# Patient Record
Sex: MALE | Race: WHITE | Employment: FULL TIME
[De-identification: names, ages, dates, MRNs, and addresses within clinical notes are randomized per-mention and may not be internally consistent; named-entity substitution may affect disease eponyms.]

---

## 2020-02-10 ENCOUNTER — HEALTH MAINTENANCE LETTER (OUTPATIENT)
Age: 60
End: 2020-02-10

## 2020-11-14 ENCOUNTER — HEALTH MAINTENANCE LETTER (OUTPATIENT)
Age: 60
End: 2020-11-14

## 2021-03-02 ASSESSMENT — ENCOUNTER SYMPTOMS
PARESTHESIAS: 0
EYE PAIN: 0
HEMATOCHEZIA: 0
WEAKNESS: 0
HEMATURIA: 0
JOINT SWELLING: 0
SORE THROAT: 0
NAUSEA: 0
CONSTIPATION: 0
COUGH: 0
PALPITATIONS: 0
ARTHRALGIAS: 0
DIARRHEA: 0
MYALGIAS: 0
DIZZINESS: 0
DYSURIA: 0
HEADACHES: 0
NERVOUS/ANXIOUS: 0
HEARTBURN: 0
FEVER: 0
FREQUENCY: 0
CHILLS: 0
SHORTNESS OF BREATH: 0
ABDOMINAL PAIN: 0

## 2021-03-09 ENCOUNTER — OFFICE VISIT (OUTPATIENT)
Dept: FAMILY MEDICINE | Facility: CLINIC | Age: 61
End: 2021-03-09
Payer: COMMERCIAL

## 2021-03-09 VITALS
WEIGHT: 242.38 LBS | TEMPERATURE: 98.8 F | BODY MASS INDEX: 32.12 KG/M2 | SYSTOLIC BLOOD PRESSURE: 128 MMHG | DIASTOLIC BLOOD PRESSURE: 88 MMHG | OXYGEN SATURATION: 96 % | HEART RATE: 87 BPM | HEIGHT: 73 IN

## 2021-03-09 DIAGNOSIS — Z00.00 ROUTINE HISTORY AND PHYSICAL EXAMINATION OF ADULT: Primary | ICD-10-CM

## 2021-03-09 DIAGNOSIS — N52.9 ERECTILE DYSFUNCTION, UNSPECIFIED ERECTILE DYSFUNCTION TYPE: ICD-10-CM

## 2021-03-09 DIAGNOSIS — Z23 ENCOUNTER FOR IMMUNIZATION: ICD-10-CM

## 2021-03-09 DIAGNOSIS — D12.6 TUBULAR ADENOMA OF COLON: ICD-10-CM

## 2021-03-09 DIAGNOSIS — R73.01 IMPAIRED FASTING GLUCOSE: ICD-10-CM

## 2021-03-09 DIAGNOSIS — H61.23 BILATERAL IMPACTED CERUMEN: ICD-10-CM

## 2021-03-09 DIAGNOSIS — Z12.11 SCREEN FOR COLON CANCER: ICD-10-CM

## 2021-03-09 DIAGNOSIS — Z13.220 LIPID SCREENING: ICD-10-CM

## 2021-03-09 LAB
ANION GAP SERPL CALCULATED.3IONS-SCNC: 3 MMOL/L (ref 3–14)
BUN SERPL-MCNC: 11 MG/DL (ref 7–30)
CALCIUM SERPL-MCNC: 8.9 MG/DL (ref 8.5–10.1)
CHLORIDE SERPL-SCNC: 109 MMOL/L (ref 94–109)
CHOLEST SERPL-MCNC: 204 MG/DL
CO2 SERPL-SCNC: 28 MMOL/L (ref 20–32)
CREAT SERPL-MCNC: 0.86 MG/DL (ref 0.66–1.25)
GFR SERPL CREATININE-BSD FRML MDRD: >90 ML/MIN/{1.73_M2}
GLUCOSE SERPL-MCNC: 129 MG/DL (ref 70–99)
HBA1C MFR BLD: 7.1 % (ref 0–5.6)
HCV AB SERPL QL IA: NONREACTIVE
HDLC SERPL-MCNC: 50 MG/DL
LDLC SERPL CALC-MCNC: 121 MG/DL
NONHDLC SERPL-MCNC: 154 MG/DL
POTASSIUM SERPL-SCNC: 4.5 MMOL/L (ref 3.4–5.3)
SODIUM SERPL-SCNC: 140 MMOL/L (ref 133–144)
TRIGL SERPL-MCNC: 167 MG/DL

## 2021-03-09 PROCEDURE — 90471 IMMUNIZATION ADMIN: CPT | Performed by: PREVENTIVE MEDICINE

## 2021-03-09 PROCEDURE — 90714 TD VACC NO PRESV 7 YRS+ IM: CPT | Performed by: PREVENTIVE MEDICINE

## 2021-03-09 PROCEDURE — 36415 COLL VENOUS BLD VENIPUNCTURE: CPT | Performed by: PREVENTIVE MEDICINE

## 2021-03-09 PROCEDURE — 99213 OFFICE O/P EST LOW 20 MIN: CPT | Mod: 25 | Performed by: PREVENTIVE MEDICINE

## 2021-03-09 PROCEDURE — 86803 HEPATITIS C AB TEST: CPT | Performed by: PREVENTIVE MEDICINE

## 2021-03-09 PROCEDURE — 80048 BASIC METABOLIC PNL TOTAL CA: CPT | Performed by: PREVENTIVE MEDICINE

## 2021-03-09 PROCEDURE — 80061 LIPID PANEL: CPT | Performed by: PREVENTIVE MEDICINE

## 2021-03-09 PROCEDURE — 83036 HEMOGLOBIN GLYCOSYLATED A1C: CPT | Performed by: PREVENTIVE MEDICINE

## 2021-03-09 PROCEDURE — 69210 REMOVE IMPACTED EAR WAX UNI: CPT | Performed by: PREVENTIVE MEDICINE

## 2021-03-09 PROCEDURE — 99396 PREV VISIT EST AGE 40-64: CPT | Mod: 25 | Performed by: PREVENTIVE MEDICINE

## 2021-03-09 RX ORDER — SILDENAFIL CITRATE 20 MG/1
TABLET ORAL
Qty: 30 TABLET | Refills: 1 | Status: SHIPPED | OUTPATIENT
Start: 2021-03-09 | End: 2021-09-23

## 2021-03-09 ASSESSMENT — ENCOUNTER SYMPTOMS
NERVOUS/ANXIOUS: 0
DIZZINESS: 0
HEADACHES: 0
SORE THROAT: 0
PARESTHESIAS: 0
COUGH: 0
HEMATURIA: 0
HEMATOCHEZIA: 0
CHILLS: 0
HEARTBURN: 0
JOINT SWELLING: 0
ARTHRALGIAS: 0
FEVER: 0
DIARRHEA: 0
FREQUENCY: 0
EYE PAIN: 0
SHORTNESS OF BREATH: 0
ABDOMINAL PAIN: 0
WEAKNESS: 0
CONSTIPATION: 0
DYSURIA: 0
PALPITATIONS: 0
NAUSEA: 0
MYALGIAS: 0

## 2021-03-09 ASSESSMENT — PAIN SCALES - GENERAL: PAINLEVEL: NO PAIN (0)

## 2021-03-09 ASSESSMENT — MIFFLIN-ST. JEOR: SCORE: 1959.32

## 2021-03-09 NOTE — NURSING NOTE
Patient identified using two patient identifiers.  Ear exam showing wax occlusion completed by provider.  Solution: warm water and H202/H20 was placed in the bilateral ear(s) via irrigation tool: elephant ear.    Art Garcia CMA    Prior to immunization administration, verified patients identity using patient s name and date of birth. Please see Immunization Activity for additional information.     Screening Questionnaire for Adult Immunization    Are you sick today?   No   Do you have allergies to medications, food, a vaccine component or latex?   No   Have you ever had a serious reaction after receiving a vaccination?   No   Do you have a long-term health problem with heart, lung, kidney, or metabolic disease (e.g., diabetes), asthma, a blood disorder, no spleen, complement component deficiency, a cochlear implant, or a spinal fluid leak?  Are you on long-term aspirin therapy?   No   Do you have cancer, leukemia, HIV/AIDS, or any other immune system problem?   No   Do you have a parent, brother, or sister with an immune system problem?   No   In the past 3 months, have you taken medications that affect  your immune system, such as prednisone, other steroids, or anticancer drugs; drugs for the treatment of rheumatoid arthritis, Crohn s disease, or psoriasis; or have you had radiation treatments?   No   Have you had a seizure, or a brain or other nervous system problem?   No   During the past year, have you received a transfusion of blood or blood    products, or been given immune (gamma) globulin or antiviral drug?   No   For women: Are you pregnant or is there a chance you could become       pregnant during the next month?   No   Have you received any vaccinations in the past 4 weeks?   No     Immunization questionnaire answers were all negative.        Per orders of Dr. Kraus, injection of Td given by Art Garcia. Patient instructed to remain in clinic for 15 minutes afterwards, and to report any adverse  reaction to me immediately.       Screening performed by Art Garcia on 3/9/2021 at 8:32 AM.

## 2021-03-09 NOTE — PROGRESS NOTES
SUBJECTIVE:   CC: Mor Henry is an 60 year old male who presents for preventative health visit.       Patient has been advised of split billing requirements and indicates understanding: Yes  Healthy Habits:     Getting at least 3 servings of Calcium per day:  Yes    Bi-annual eye exam:  NO    Dental care twice a year:  Yes    Sleep apnea or symptoms of sleep apnea:  None    Diet:  Regular (no restrictions)    Frequency of exercise:  4-5 days/week    Duration of exercise:  15-30 minutes    Taking medications regularly:  Yes    Medication side effects:  None    PHQ-2 Total Score: 0    Additional concerns today:  No      Would like to discuss Viagra script:  -problems maintaining erection  -no penile pain or discharge  -no urine symptoms  -No use of cardiac medication or prostate medication   -no fever  -no abdominal pain      Today's PHQ-2 Score:   PHQ-2 ( 1999 [employer]) 3/2/2021   Q1: Little interest or pleasure in doing things 0   Q2: Feeling down, depressed or hopeless 0   PHQ-2 Score 0   Q1: Little interest or pleasure in doing things Not at all   Q2: Feeling down, depressed or hopeless Not at all   PHQ-2 Score 0       Abuse: Current or Past(Physical, Sexual or Emotional)- No  Do you feel safe in your environment? Yes    Have you ever done Advance Care Planning? (For example, a Health Directive, POLST, or a discussion with a medical provider or your loved ones about your wishes): No, advance care planning information given to patient to review.  Patient plans to discuss their wishes with loved ones or provider.      Social History     Tobacco Use     Smoking status: Never Smoker     Smokeless tobacco: Never Used   Substance Use Topics     Alcohol use: Yes     Alcohol/week: 0.0 standard drinks     If you drink alcohol do you typically have >3 drinks per day or >7 drinks per week? No    No flowsheet data found.    Last PSA: No results found for: PSA    Reviewed orders with patient. Reviewed health maintenance  and updated orders accordingly - Yes  Lab work is in process  Labs reviewed in EPIC  BP Readings from Last 3 Encounters:   03/09/21 128/88   10/05/15 123/88   08/25/15 135/85    Wt Readings from Last 3 Encounters:   03/09/21 109.9 kg (242 lb 6 oz)   10/05/15 102.1 kg (225 lb)   08/25/15 107 kg (236 lb)                  Patient Active Problem List   Diagnosis     CARDIOVASCULAR SCREENING; LDL GOAL LESS THAN 160     Obesity, Class I, BMI 30-34.9     Advanced directives, counseling/discussion     Impaired fasting glucose     Diverticulosis of large intestine     Past Surgical History:   Procedure Laterality Date     COLONOSCOPY N/A 10/5/2015    Procedure: COLONOSCOPY;  Surgeon: Emile Holloway MD;  Location: [facility] OR     COLONOSCOPY WITH CO2 INSUFFLATION N/A 10/5/2015    Procedure: COLONOSCOPY WITH CO2 INSUFFLATION;  Surgeon: Emile Holloway MD;  Location: [facility] OR       Social History     Tobacco Use     Smoking status: Never Smoker     Smokeless tobacco: Never Used   Substance Use Topics     Alcohol use: Yes     Alcohol/week: 0.0 standard drinks     History reviewed. No pertinent family history.      Current Outpatient Medications   Medication Sig Dispense Refill     sildenafil (REVATIO) 20 MG tablet 1-5 pills 2 hours before intercourse. Maximum 100 mg in 24 hours. 30 tablet 1     No Known Allergies    Reviewed and updated as needed this visit by clinical staff  Tobacco  Allergies  Meds  Problems  Med Hx  Surg Hx  Fam Hx  Soc Hx          Reviewed and updated as needed this visit by Provider  Tobacco  Allergies  Meds  Problems  Med Hx  Surg Hx  Fam Hx         History reviewed. No pertinent past medical history.   Past Surgical History:   Procedure Laterality Date     COLONOSCOPY N/A 10/5/2015    Procedure: COLONOSCOPY;  Surgeon: Emile Holloway MD;  Location: [facility] OR     COLONOSCOPY WITH CO2 INSUFFLATION N/A 10/5/2015    Procedure: COLONOSCOPY WITH CO2 INSUFFLATION;  Surgeon: Jewel  "Emile Moraes MD;  Location:  OR       Review of Systems   Constitutional: Negative for chills and fever.   HENT: Negative for congestion, ear pain, hearing loss and sore throat.    Eyes: Negative for pain and visual disturbance.   Respiratory: Negative for cough and shortness of breath.    Cardiovascular: Negative for chest pain, palpitations and peripheral edema.   Gastrointestinal: Negative for abdominal pain, constipation, diarrhea, heartburn, hematochezia and nausea.   Genitourinary: Positive for impotence. Negative for discharge, dysuria, frequency, genital sores, hematuria and urgency.   Musculoskeletal: Negative for arthralgias, joint swelling and myalgias.   Skin: Negative for rash.   Neurological: Negative for dizziness, weakness, headaches and paresthesias.   Psychiatric/Behavioral: Negative for mood changes. The patient is not nervous/anxious.      CONSTITUTIONAL: NEGATIVE for fever, chills, change in weight  INTEGUMENTARY/SKIN: NEGATIVE for worrisome rashes, moles or lesions  EYES: NEGATIVE for vision changes or irritation  ENT: NEGATIVE for ear, mouth and throat problems  RESP: NEGATIVE for significant cough or SOB  CV: NEGATIVE for chest pain, palpitations or peripheral edema  GI: NEGATIVE for nausea, abdominal pain, heartburn, or change in bowel habits  MUSCULOSKELETAL: NEGATIVE for significant arthralgias or myalgia  NEURO: NEGATIVE for weakness, dizziness or paresthesias  ENDOCRINE: NEGATIVE for temperature intolerance, skin/hair changes  HEME/ALLERGY/IMMUNE: NEGATIVE for bleeding problems  PSYCHIATRIC: NEGATIVE for changes in mood or affect    OBJECTIVE:   /88 (BP Location: Left arm, Patient Position: Sitting, Cuff Size: Adult Large)   Pulse 87   Temp 98.8  F (37.1  C) (Oral)   Ht 1.848 m (6' 0.75\")   Wt 109.9 kg (242 lb 6 oz)   SpO2 96%   BMI 32.20 kg/m      Physical Exam  GENERAL APPEARANCE: healthy, alert and no distress  EYES: Eyes grossly normal to inspection and conjunctivae and " sclerae normal  HENT: Bilateral impacted cerumen, ear wash done by the MA, able to see TMs, no perforation, no erythema   NECK: no adenopathy and trachea midline and normal to palpation  RESP: lungs clear to auscultation - no rales, rhonchi or wheezes  CV: regular rates and rhythm, normal S1 S2, no S3 or S4 and no murmur, click or rub  ABDOMEN: soft, non-tender and no rebound or guarding   MS: extremities normal- no gross deformities noted and peripheral pulses normal  SKIN: no suspicious lesions or rashes, seborrheic keratosis on the back+  NEURO: Normal strength and tone, mentation intact and speech normal  PSYCH: mentation appears normal      Diagnostic Test Results: Pending       ASSESSMENT/PLAN:   Mor was seen today for physical.    Diagnoses and all orders for this visit:    Routine history and physical examination of adult  -     Hemoglobin A1c  -     Lipid panel reflex to direct LDL Fasting  -     Hepatitis C antibody    Impaired fasting glucose  -     Hemoglobin A1c    Lipid screening  -     Lipid panel reflex to direct LDL Fasting    Tubular adenoma of colon  -     GASTROENTEROLOGY ADULT REF PROCEDURE ONLY; Future    Screen for colon cancer  -     GASTROENTEROLOGY ADULT REF PROCEDURE ONLY; Future    Encounter for immunization  -     TD PRESERV FREE, IM (7+ YRS)    Bilateral impacted cerumen  -ear wash done by the MA    Erectile dysfunction, unspecified erectile dysfunction type  -     sildenafil (REVATIO) 20 MG tablet; 1-5 pills 2 hours before intercourse. Maximum 100 mg in 24 hours.  -     Basic metabolic panel    He is informed that Viagra is usually not covered by insurance. It is available on a fee-for-service cost basis, and is relatively expensive. He can start with 20 mg and increase to 100 mg if necessary. The method of use 1 hour prior to anticipated intercourse is explained. He should not use any more than one tablet in a 24 hour period. The side effects of possible headache, flushing,  "dyspepsia and transient changes in vision have been explained. The patient is not taking nitrates, and denies he has access to nitrates in any form at any time. I have counseled him that taking Viagra with nitrates of any form can cause death. Additionally, Viagra serum concentrations can be increased by the following: cimetidine, erythromycin, itraconazole or ketoconazole. This patient does not take these drugs.    Patient has been advised of split billing requirements and indicates understanding: Yes  COUNSELING:   Reviewed preventive health counseling, as reflected in patient instructions       Regular exercise       Healthy diet/nutrition       Vision screening       Immunizations    Vaccinated for: Td             Consider Hep C screening for all patients one time for ages 18-79 years       Colon cancer screening    Estimated body mass index is 32.2 kg/m  as calculated from the following:    Height as of this encounter: 1.848 m (6' 0.75\").    Weight as of this encounter: 109.9 kg (242 lb 6 oz).     Weight management plan: Discussed healthy diet and exercise guidelines    He reports that he has never smoked. He has never used smokeless tobacco.      Counseling Resources:  ATP IV Guidelines  Pooled Cohorts Equation Calculator  FRAX Risk Assessment  ICSI Preventive Guidelines  Dietary Guidelines for Americans, 2010  USDA's MyPlate  ASA Prophylaxis  Lung CA Screening    Julia Kraus MD MPH    [facility]  "

## 2021-03-10 NOTE — RESULT ENCOUNTER NOTE
Mor,     Three month glucose number is showing that you have Diabetes.  Electrolytes and kidney function are normal.  Screening test for Hepatitis C is negative.  LDL cholesterol is elevated at 121, should be less than 100 in diabetics.  Please schedule a Video visit to discuss management of diabetes.    Please do not hesitate to call us at [phone] if you have any questions or concerns.    Thank you,    Julia Kraus MD MPH

## 2021-03-16 ENCOUNTER — VIRTUAL VISIT (OUTPATIENT)
Dept: FAMILY MEDICINE | Facility: CLINIC | Age: 61
End: 2021-03-16
Payer: COMMERCIAL

## 2021-03-16 VITALS — HEIGHT: 72 IN | WEIGHT: 242 LBS | BODY MASS INDEX: 32.78 KG/M2

## 2021-03-16 DIAGNOSIS — E11.9 TYPE 2 DIABETES MELLITUS WITHOUT COMPLICATION, WITHOUT LONG-TERM CURRENT USE OF INSULIN (H): Primary | ICD-10-CM

## 2021-03-16 DIAGNOSIS — E78.5 HYPERLIPIDEMIA LDL GOAL <100: ICD-10-CM

## 2021-03-16 PROCEDURE — 99214 OFFICE O/P EST MOD 30 MIN: CPT | Mod: 95 | Performed by: PREVENTIVE MEDICINE

## 2021-03-16 RX ORDER — ATORVASTATIN CALCIUM 20 MG/1
20 TABLET, FILM COATED ORAL DAILY
Qty: 90 TABLET | Refills: 1 | Status: SHIPPED | OUTPATIENT
Start: 2021-03-16 | End: 2021-09-23

## 2021-03-16 ASSESSMENT — MIFFLIN-ST. JEOR: SCORE: 1945.7

## 2021-03-16 NOTE — PROGRESS NOTES
Forrest is a 60 year old who is being evaluated via a billable telephone visit.      What phone number would you like to be contacted at? [phone]   How would you like to obtain your AVS? MyChart    Assessment & Plan     Type 2 diabetes mellitus without complication, without long-term current use of insulin (H)  -New diagnosis  -HbA1C is at 7.1  -patient would like to start with lifestyle modifications before using oral hypoglycemics  -recheck labs in 2-3 months, if not at goal then start Metformin XR   -we discussed a goal weight loss of 5% body weight in the next 6 months   - aspirin (ASA) 81 MG EC tablet  Dispense: 90 tablet; Refill: 3  - Albumin Random Urine Quantitative with Creat Ratio  - Basic metabolic panel  - LDL cholesterol direct  - Hemoglobin A1c    Hyperlipidemia LDL goal <100  -LDL at 121  - atorvastatin (LIPITOR) 20 MG tablet  Dispense: 90 tablet; Refill: 1  - LDL cholesterol direct    The 10-year ASCVD risk score (Anuradha SHERIFF Jr., et al., 2013) is: 16.3%    Values used to calculate the score:      Age: 60 years      Sex: Male      Is Non- : No      Diabetic: Yes      Tobacco smoker: No      Systolic Blood Pressure: 128 mmHg      Is BP treated: No      HDL Cholesterol: 50 mg/dL      Total Cholesterol: 204 mg/dL      20 minutes spent on the date of the encounter doing chart review, history and exam, documentation and further activities as noted above         Return in about 2 months (around 5/16/2021) for labs.    Julia Kraus MD MPH    [facility]   Forrest is a 60 year old who presents for the following health issues:  HPI     Diabetes New Diagnosis       How often are you checking your blood sugar? Not at all    What concerns do you have today about your diabetes? None     Do you have any of these symptoms? (Select all that apply)  No numbness or tingling in feet.  No redness, sores or blisters on feet.  No complaints of excessive thirst.   No reports of blurry vision.  No significant changes to weight.      BP Readings from Last 2 Encounters:   03/09/21 128/88   10/05/15 123/88     Hemoglobin A1C (%)   Date Value   03/09/2021 7.1 (H)     LDL Cholesterol Calculated (mg/dL)   Date Value   03/09/2021 121 (H)   08/25/2015 112                 How many servings of fruits and vegetables do you eat daily?  2-3    On average, how many sweetened beverages do you drink each day (Examples: soda, juice, sweet tea, etc.  Do NOT count diet or artificially sweetened beverages)?   A couple per week    How many days per week do you exercise enough to make your heart beat faster? 5    How many minutes a day do you exercise enough to make your heart beat faster? 30 - 60    How many days per week do you miss taking your medication? 0        Review of Systems   Constitutional, HEENT, cardiovascular, pulmonary, gi and gu systems are negative, except as otherwise noted.      Objective           Vitals:  No vitals were obtained today due to virtual visit.    Physical Exam   healthy, alert and no distress  PSYCH: Alert and oriented times 3; coherent speech, normal   rate and volume, able to articulate logical thoughts, able   to abstract reason, no tangential thoughts, no hallucinations   or delusions  His affect is normal  RESP: No cough, no audible wheezing, able to talk in full sentences  Remainder of exam unable to be completed due to telephone visits    Office Visit on 03/09/2021   Component Date Value Ref Range Status     Hemoglobin A1C 03/09/2021 7.1* 0 - 5.6 % Final    Comment: Normal <5.7% Prediabetes 5.7-6.4%  Diabetes 6.5% or higher - adopted from ADA   consensus guidelines.       Cholesterol 03/09/2021 204* <200 mg/dL Final    Desirable:       <200 mg/dl     Triglycerides 03/09/2021 167* <150 mg/dL Final    Comment: Borderline high:  150-199 mg/dl  High:             200-499 mg/dl  Very high:       >499 mg/dl       HDL Cholesterol 03/09/2021 50  >39 mg/dL Final      LDL Cholesterol Calculated 03/09/2021 121* <100 mg/dL Final    Comment: Above desirable:  100-129 mg/dl  Borderline High:  130-159 mg/dL  High:             160-189 mg/dL  Very high:       >189 mg/dl       Non HDL Cholesterol 03/09/2021 154* <130 mg/dL Final    Comment: Above Desirable:  130-159 mg/dl  Borderline high:  160-189 mg/dl  High:             190-219 mg/dl  Very high:       >219 mg/dl       Hepatitis C Antibody 03/09/2021 Nonreactive  NR^Nonreactive Final    Comment: Assay performance characteristics have not been established for newborns,   infants, and children       Sodium 03/09/2021 140  133 - 144 mmol/L Final     Potassium 03/09/2021 4.5  3.4 - 5.3 mmol/L Final     Chloride 03/09/2021 109  94 - 109 mmol/L Final     Carbon Dioxide 03/09/2021 28  20 - 32 mmol/L Final     Anion Gap 03/09/2021 3  3 - 14 mmol/L Final     Glucose 03/09/2021 129* 70 - 99 mg/dL Final     Urea Nitrogen 03/09/2021 11  7 - 30 mg/dL Final     Creatinine 03/09/2021 0.86  0.66 - 1.25 mg/dL Final     GFR Estimate 03/09/2021 >90  >60 mL/min/[1.73_m2] Final    Comment: Non  GFR Calc  Starting 12/18/2018, serum creatinine based estimated GFR (eGFR) will be   calculated using the Chronic Kidney Disease Epidemiology Collaboration   (CKD-EPI) equation.       GFR Estimate If Black 03/09/2021 >90  >60 mL/min/[1.73_m2] Final    Comment:  GFR Calc  Starting 12/18/2018, serum creatinine based estimated GFR (eGFR) will be   calculated using the Chronic Kidney Disease Epidemiology Collaboration   (CKD-EPI) equation.       Calcium 03/09/2021 8.9  8.5 - 10.1 mg/dL Final             Phone call duration: 8 minutes

## 2021-03-16 NOTE — PATIENT INSTRUCTIONS
Patient Education   Sample Meal Plan for Diabetes  Breakfast (4 carb choices, 60 grams carbohydrate)  Coffee, tea or water  1 cup (8 ounces) skim or 1% milk (1-carb choice)  1 small piece of fresh fruit or 1 cup berries (1-carb choice)  Any one of the following (2-carb choice):    1 slice toast with 1 tablespoon of margarine or peanut butter and   cup of cereal with skim or 1% milk    1 cup cereal with skim or 1% milk    1 egg and 2 slices toast with 1 tablespoon margarine or peanut butter  Lunch (4 carb choices, 60 grams carbohydrate)  Coffee, tea or water  1 cup (8 ounces) skim or 1% milk (1-carb choice)  Small piece fresh fruit or 1 cup berries (1-carb choice)  Raw vegetables (add to sandwich or serve on the side)  Any one of the following (2-carb choice):     [place] (made with 2 slices whole-grain bread)      sandwich with 1 cup soup    2 corn tortillas with meat and vegetables  Dinner (4 carb choices, 60 grams carbohydrate)  Coffee, tea or water  Breast of chicken or pork chop (3 to 4 ounces)  Cooked vegetable  Tossed salad with small amount of low-fat dressing  1 cup (8 ounces) skim or 1% milk (1-carb choice)  1 small piece fruit or   cup canned fruit, packed in juice or light syrup (1-carb choice)  Any one of the following (2-carb choice):    Medium baked potato    1 cup mashed potato    2/3 cup rice or pasta  Snacks (1 or 2 carb choices, 15 to 30 grams carbohydrate)  Any one or two of the following:    Small piece fresh fruit    3 yocasta cracker squares    1 cup raw vegetables with low-fat dip    1 ounce (12 to 15) baked chips with salsa    Light yogurt (100 calories)    1 cup (8 ounces) skim or 1% milk    3 cups popped popcorn    6 vanilla wafers  For informational purposes only. Not to replace the advice of your health care provider.   Copyright   2007 [employer] [facility]. All rights reserved. [employer] [id number] - REV 04/16.       Patient Education   Living Healthy with Diabetes  Healthful  eating  Healthful eating means eating well-balanced meals and snacks at regular times.    Eat a variety of healthy foods to help control blood glucose (blood sugar).    Space your meals during the day. Try to eat a meal every 4 to 5 hours. Include a variety of foods from all food groups.    If you wish, you may have one or two small snacks between meals. Snacks should be nutritious and lower in calories than a meal. (For example: fat-free yogurt, 1/2 cup fruit, 3 cups popcorn, 1/4 cup nuts). Ask your dietitian about healthful snacks.    Do not skip meals.  There are no foods that you cannot eat. But it is important to know which foods most affect your blood glucose.  Three main nutrients give the body energy (calories):    Carbohydrate    Protein    Fat    Foods with carbohydrate will raise blood glucose. These foods include breads, pasta, cereals, rice, fruits, milk and yogurt.    Do not avoid carbohydrate. Eating the right amount of carbohydrate at meals and snacks will help to control blood glucose.    Check blood glucose as directed by your health care provider to see how food choices affect it.  Healthy Eating at a Glance  ? Pay attention to portion sizes.  ? Eat a variety of healthy foods every day.  ? Choose foods high in nutrition:    Fruits and vegetables    Beans and legumes    Whole grains    Heart healthy fats    Lean meats and proteins    Foods without added sodium, sugars and fat    Manage your weight  Managing weight is not only about how much you eat, but also about the quality of foods you eat.    Eat smaller portions.    Eat less sugar.    Eat less of the high-fat foods.  ? Eat smaller portions of healthy fats such as nuts, avocado and olives.  ? Limit fried foods, fatty meats (val[place], sausage, hot dogs, cold cuts), butter, salad dressings, cream, gravy, chips, bakery items, whole milk, ice cream, pizza, fast food and hard cheeses.    Choose high-fiber foods such as vegetables, fruits and  whole-grain breads and cereals.  ? These foods help you to feel more satisfied with your meal or snack. They are full of nutrients.    Eat mindfully  ? Listen to your body's signals for hunger. Eat when you feel hungry and stop when you start to feel full.  ? Avoid eating when bored, sad or upset.  Physical activity  Activity can help control your glucose levels. The American Diabetes Association recommends 150 minutes of moderate physical activity (walking, biking, swimming) a week or 75 minutes of vigorous activity (jogging, aerobics) per week. Add to that strength training (lifting weights, resistance bands) two or three days a week.  Talk to your doctor before starting an activity program. This is very important if:    You are over age 35.    You have had type 1 diabetes for more than 15 years.    You have had type 2 diabetes for more than 10 years.    You have any risk factors for heart or artery disease (such as high blood pressure, high cholesterol or being overweight).    You have a history of heart or artery disease.    You have any kind of nerve damage (neuropathy).    You have eye disease (retinopathy).  For informational purposes only. Not to replace the advice of your health care provider.  Copyright   2007 [employer] [facility]. All rights reserved. [employer] [id number] - REV 03/16.

## 2021-04-12 DIAGNOSIS — Z11.59 ENCOUNTER FOR SCREENING FOR OTHER VIRAL DISEASES: ICD-10-CM

## 2021-04-16 RX ORDER — BISACODYL 5 MG
5 TABLET, DELAYED RELEASE (ENTERIC COATED) ORAL SEE ADMIN INSTRUCTIONS
Qty: 1 TABLET | Refills: 0 | Status: SHIPPED | OUTPATIENT
Start: 2021-04-16 | End: 2022-01-21

## 2021-04-16 RX ORDER — SODIUM, POTASSIUM,MAG SULFATES 17.5-3.13G
1 SOLUTION, RECONSTITUTED, ORAL ORAL SEE ADMIN INSTRUCTIONS
Qty: 1 ML | Refills: 0 | Status: SHIPPED | OUTPATIENT
Start: 2021-04-16 | End: 2022-01-21

## 2021-04-23 DIAGNOSIS — Z11.59 ENCOUNTER FOR SCREENING FOR OTHER VIRAL DISEASES: ICD-10-CM

## 2021-04-23 LAB
SARS-COV-2 RNA RESP QL NAA+PROBE: NORMAL
SPECIMEN SOURCE: NORMAL

## 2021-04-23 PROCEDURE — U0003 INFECTIOUS AGENT DETECTION BY NUCLEIC ACID (DNA OR RNA); SEVERE ACUTE RESPIRATORY SYNDROME CORONAVIRUS 2 (SARS-COV-2) (CORONAVIRUS DISEASE [COVID-19]), AMPLIFIED PROBE TECHNIQUE, MAKING USE OF HIGH THROUGHPUT TECHNOLOGIES AS DESCRIBED BY CMS-2020-01-R: HCPCS | Performed by: SURGERY

## 2021-04-23 PROCEDURE — U0005 INFEC AGEN DETEC AMPLI PROBE: HCPCS | Performed by: SURGERY

## 2021-04-24 LAB
LABORATORY COMMENT REPORT: NORMAL
SARS-COV-2 RNA RESP QL NAA+PROBE: NEGATIVE
SPECIMEN SOURCE: NORMAL

## 2021-04-26 ENCOUNTER — HOSPITAL ENCOUNTER (OUTPATIENT)
Facility: AMBULATORY SURGERY CENTER | Age: 61
Discharge: HOME | End: 2021-04-26
Attending: SURGERY | Admitting: SURGERY
Payer: COMMERCIAL

## 2021-04-26 VITALS
RESPIRATION RATE: 16 BRPM | DIASTOLIC BLOOD PRESSURE: 84 MMHG | HEART RATE: 59 BPM | TEMPERATURE: 98.2 F | BODY MASS INDEX: 31.19 KG/M2 | OXYGEN SATURATION: 93 % | WEIGHT: 230 LBS | SYSTOLIC BLOOD PRESSURE: 128 MMHG

## 2021-04-26 DIAGNOSIS — Z12.11 SPECIAL SCREENING FOR MALIGNANT NEOPLASMS, COLON: Primary | ICD-10-CM

## 2021-04-26 LAB — COLONOSCOPY: NORMAL

## 2021-04-26 PROCEDURE — 45385 COLONOSCOPY W/LESION REMOVAL: CPT | Mod: PT

## 2021-04-26 PROCEDURE — 99152 MOD SED SAME PHYS/QHP 5/>YRS: CPT | Mod: 59 | Performed by: SURGERY

## 2021-04-26 PROCEDURE — 45385 COLONOSCOPY W/LESION REMOVAL: CPT | Mod: PT | Performed by: SURGERY

## 2021-04-26 PROCEDURE — G8907 PT DOC NO EVENTS ON DISCHARG: HCPCS

## 2021-04-26 PROCEDURE — G8918 PT W/O PREOP ORDER IV AB PRO: HCPCS

## 2021-04-26 PROCEDURE — 88305 TISSUE EXAM BY PATHOLOGIST: CPT | Performed by: PATHOLOGY

## 2021-04-26 PROCEDURE — 99153 MOD SED SAME PHYS/QHP EA: CPT | Mod: 59 | Performed by: SURGERY

## 2021-04-26 RX ORDER — ONDANSETRON 4 MG/1
4 TABLET, ORALLY DISINTEGRATING ORAL EVERY 6 HOURS PRN
Status: DISCONTINUED | OUTPATIENT
Start: 2021-04-26 | End: 2021-04-27 | Stop reason: HOSPADM

## 2021-04-26 RX ORDER — FLUMAZENIL 0.1 MG/ML
0.2 INJECTION, SOLUTION INTRAVENOUS
Status: ACTIVE | OUTPATIENT
Start: 2021-04-26 | End: 2021-04-26

## 2021-04-26 RX ORDER — NALOXONE HYDROCHLORIDE 0.4 MG/ML
0.4 INJECTION, SOLUTION INTRAMUSCULAR; INTRAVENOUS; SUBCUTANEOUS
Status: DISCONTINUED | OUTPATIENT
Start: 2021-04-26 | End: 2021-04-27 | Stop reason: HOSPADM

## 2021-04-26 RX ORDER — FENTANYL CITRATE 50 UG/ML
INJECTION, SOLUTION INTRAMUSCULAR; INTRAVENOUS PRN
Status: DISCONTINUED | OUTPATIENT
Start: 2021-04-26 | End: 2021-04-26 | Stop reason: HOSPADM

## 2021-04-26 RX ORDER — PROCHLORPERAZINE MALEATE 10 MG
10 TABLET ORAL EVERY 6 HOURS PRN
Status: DISCONTINUED | OUTPATIENT
Start: 2021-04-26 | End: 2021-04-27 | Stop reason: HOSPADM

## 2021-04-26 RX ORDER — NALOXONE HYDROCHLORIDE 0.4 MG/ML
0.2 INJECTION, SOLUTION INTRAMUSCULAR; INTRAVENOUS; SUBCUTANEOUS
Status: DISCONTINUED | OUTPATIENT
Start: 2021-04-26 | End: 2021-04-27 | Stop reason: HOSPADM

## 2021-04-26 RX ORDER — LIDOCAINE 40 MG/G
CREAM TOPICAL
Status: DISCONTINUED | OUTPATIENT
Start: 2021-04-26 | End: 2021-04-27 | Stop reason: HOSPADM

## 2021-04-26 RX ORDER — ONDANSETRON 2 MG/ML
4 INJECTION INTRAMUSCULAR; INTRAVENOUS EVERY 6 HOURS PRN
Status: DISCONTINUED | OUTPATIENT
Start: 2021-04-26 | End: 2021-04-27 | Stop reason: HOSPADM

## 2021-04-28 LAB — COPATH REPORT: NORMAL

## 2021-07-18 ENCOUNTER — HEALTH MAINTENANCE LETTER (OUTPATIENT)
Age: 61
End: 2021-07-18

## 2021-09-12 ENCOUNTER — HEALTH MAINTENANCE LETTER (OUTPATIENT)
Age: 61
End: 2021-09-12

## 2021-09-14 ENCOUNTER — LAB (OUTPATIENT)
Dept: LAB | Facility: CLINIC | Age: 61
End: 2021-09-14
Payer: COMMERCIAL

## 2021-09-14 DIAGNOSIS — E78.5 HYPERLIPIDEMIA LDL GOAL <100: ICD-10-CM

## 2021-09-14 DIAGNOSIS — E11.9 TYPE 2 DIABETES MELLITUS WITHOUT COMPLICATION, WITHOUT LONG-TERM CURRENT USE OF INSULIN (H): ICD-10-CM

## 2021-09-14 LAB
ANION GAP SERPL CALCULATED.3IONS-SCNC: 7 MMOL/L (ref 3–14)
BUN SERPL-MCNC: 12 MG/DL (ref 7–30)
CALCIUM SERPL-MCNC: 8.9 MG/DL (ref 8.5–10.1)
CHLORIDE BLD-SCNC: 105 MMOL/L (ref 94–109)
CO2 SERPL-SCNC: 27 MMOL/L (ref 20–32)
CREAT SERPL-MCNC: 0.74 MG/DL (ref 0.66–1.25)
GFR SERPL CREATININE-BSD FRML MDRD: >90 ML/MIN/1.73M2
GLUCOSE BLD-MCNC: 84 MG/DL (ref 70–99)
HBA1C MFR BLD: 5.8 % (ref 0–5.6)
LDLC SERPL CALC-MCNC: 63 MG/DL
POTASSIUM BLD-SCNC: 3.8 MMOL/L (ref 3.4–5.3)
SODIUM SERPL-SCNC: 139 MMOL/L (ref 133–144)

## 2021-09-14 PROCEDURE — 36415 COLL VENOUS BLD VENIPUNCTURE: CPT

## 2021-09-14 PROCEDURE — 83036 HEMOGLOBIN GLYCOSYLATED A1C: CPT

## 2021-09-14 PROCEDURE — 82043 UR ALBUMIN QUANTITATIVE: CPT

## 2021-09-14 PROCEDURE — 80048 BASIC METABOLIC PNL TOTAL CA: CPT

## 2021-09-14 PROCEDURE — 83721 ASSAY OF BLOOD LIPOPROTEIN: CPT

## 2021-09-14 NOTE — RESULT ENCOUNTER NOTE
Mor,     3-month glucose number hemoglobin A1c has improved significantly from 7.1 to 5.8.  This is very good progress!  Other lab results are pending at this time.    Please do not hesitate to call us at [phone] if you have any questions or concerns.    Thank you,    Julia Kraus MD MPH

## 2021-09-15 LAB
CREAT UR-MCNC: 11 MG/DL
MICROALBUMIN UR-MCNC: <5 MG/L
MICROALBUMIN/CREAT UR: NORMAL MG/G{CREAT}

## 2021-09-15 NOTE — RESULT ENCOUNTER NOTE
Mor, your test results were within normal limits.  Electrolytes, glucose and kidney function are normal.  LDL cholesterol has improved from 121 to 63 and is at goal.     Please do not hesitate to call us at [phone] if you have any questions or concerns.    Thank you,    Julia Kraus MD MPH

## 2021-09-16 NOTE — RESULT ENCOUNTER NOTE
Mor,    Urine sample is not showing any abnormal protein.     Please do not hesitate to call us at [phone] if you have any questions or concerns.    Thank you,    Julia Kraus MD MPH

## 2021-09-22 DIAGNOSIS — N52.9 ERECTILE DYSFUNCTION, UNSPECIFIED ERECTILE DYSFUNCTION TYPE: ICD-10-CM

## 2021-09-22 DIAGNOSIS — E78.5 HYPERLIPIDEMIA LDL GOAL <100: ICD-10-CM

## 2021-09-23 RX ORDER — ATORVASTATIN CALCIUM 20 MG/1
20 TABLET, FILM COATED ORAL DAILY
Qty: 90 TABLET | Refills: 3 | Status: SHIPPED | OUTPATIENT
Start: 2021-09-23 | End: 2022-09-26

## 2021-09-23 RX ORDER — SILDENAFIL CITRATE 20 MG/1
TABLET ORAL
Qty: 30 TABLET | Refills: 2 | Status: SHIPPED | OUTPATIENT
Start: 2021-09-23 | End: 2022-06-30

## 2022-01-02 ENCOUNTER — HEALTH MAINTENANCE LETTER (OUTPATIENT)
Age: 62
End: 2022-01-02

## 2022-01-18 ASSESSMENT — ENCOUNTER SYMPTOMS
HEARTBURN: 0
MYALGIAS: 0
DIARRHEA: 0
SHORTNESS OF BREATH: 0
NAUSEA: 0
FEVER: 0
DIZZINESS: 0
DYSURIA: 0
ARTHRALGIAS: 0
NERVOUS/ANXIOUS: 0
PALPITATIONS: 0
FREQUENCY: 0
COUGH: 0
WEAKNESS: 0
HEADACHES: 0
CONSTIPATION: 0
PARESTHESIAS: 0
JOINT SWELLING: 0
SORE THROAT: 0
ABDOMINAL PAIN: 0
HEMATOCHEZIA: 0
EYE PAIN: 0
HEMATURIA: 0
CHILLS: 0

## 2022-01-21 ENCOUNTER — OFFICE VISIT (OUTPATIENT)
Dept: FAMILY MEDICINE | Facility: CLINIC | Age: 62
End: 2022-01-21
Payer: COMMERCIAL

## 2022-01-21 VITALS
HEIGHT: 73 IN | HEART RATE: 72 BPM | BODY MASS INDEX: 28.57 KG/M2 | WEIGHT: 215.6 LBS | DIASTOLIC BLOOD PRESSURE: 84 MMHG | TEMPERATURE: 97.7 F | SYSTOLIC BLOOD PRESSURE: 128 MMHG | OXYGEN SATURATION: 97 %

## 2022-01-21 DIAGNOSIS — Z00.00 ROUTINE HISTORY AND PHYSICAL EXAMINATION OF ADULT: Primary | ICD-10-CM

## 2022-01-21 DIAGNOSIS — Z12.5 SCREENING FOR PROSTATE CANCER: ICD-10-CM

## 2022-01-21 DIAGNOSIS — R97.20 ELEVATED PROSTATE SPECIFIC ANTIGEN (PSA): ICD-10-CM

## 2022-01-21 DIAGNOSIS — Z13.1 SCREENING FOR DIABETES MELLITUS: ICD-10-CM

## 2022-01-21 LAB
CHOLEST SERPL-MCNC: 123 MG/DL
FASTING STATUS PATIENT QL REPORTED: YES
HBA1C MFR BLD: 6 % (ref 0–5.6)
HDLC SERPL-MCNC: 61 MG/DL
LDLC SERPL CALC-MCNC: 42 MG/DL
NONHDLC SERPL-MCNC: 62 MG/DL
PSA SERPL-MCNC: 5.55 UG/L (ref 0–4)
TRIGL SERPL-MCNC: 99 MG/DL

## 2022-01-21 PROCEDURE — 36415 COLL VENOUS BLD VENIPUNCTURE: CPT | Performed by: INTERNAL MEDICINE

## 2022-01-21 PROCEDURE — G0103 PSA SCREENING: HCPCS | Performed by: INTERNAL MEDICINE

## 2022-01-21 PROCEDURE — 80061 LIPID PANEL: CPT | Performed by: INTERNAL MEDICINE

## 2022-01-21 PROCEDURE — 99396 PREV VISIT EST AGE 40-64: CPT | Performed by: INTERNAL MEDICINE

## 2022-01-21 PROCEDURE — 83036 HEMOGLOBIN GLYCOSYLATED A1C: CPT | Performed by: INTERNAL MEDICINE

## 2022-01-21 ASSESSMENT — ENCOUNTER SYMPTOMS
WEAKNESS: 0
DIARRHEA: 0
ABDOMINAL PAIN: 0
ARTHRALGIAS: 0
HEADACHES: 0
FREQUENCY: 0
DIZZINESS: 0
SHORTNESS OF BREATH: 0
CONSTIPATION: 0
HEARTBURN: 0
PARESTHESIAS: 0
SORE THROAT: 0
EYE PAIN: 0
DYSURIA: 0
NERVOUS/ANXIOUS: 0
NAUSEA: 0
JOINT SWELLING: 0
HEMATURIA: 0
HEMATOCHEZIA: 0
PALPITATIONS: 0
CHILLS: 0
FEVER: 0
COUGH: 0
MYALGIAS: 0

## 2022-01-21 ASSESSMENT — MIFFLIN-ST. JEOR: SCORE: 1831.09

## 2022-01-21 ASSESSMENT — PAIN SCALES - GENERAL: PAINLEVEL: NO PAIN (0)

## 2022-01-21 NOTE — PROGRESS NOTES
SUBJECTIVE:   CC: Mor Henry is an 61 year old male who presents for preventative health visit.       Patient has been advised of split billing requirements and indicates understanding: No  Healthy Habits:     Getting at least 3 servings of Calcium per day:  Yes    Bi-annual eye exam:  Yes    Dental care twice a year:  Yes    Sleep apnea or symptoms of sleep apnea:  None    Diet:  Carbohydrate counting    Frequency of exercise:  2-3 days/week    Duration of exercise:  30-45 minutes    Taking medications regularly:  Yes    Medication side effects:  None    PHQ-2 Total Score: 0    Additional concerns today:  No              Today's PHQ-2 Score:   PHQ-2 ( 1999 [employer]) 1/18/2022   Q1: Little interest or pleasure in doing things 0   Q2: Feeling down, depressed or hopeless 0   PHQ-2 Score 0   PHQ-2 Total Score (12-17 Years)- Positive if 3 or more points; Administer PHQ-A if positive -   Q1: Little interest or pleasure in doing things Not at all   Q2: Feeling down, depressed or hopeless Not at all   PHQ-2 Score 0       Abuse: Current or Past(Physical, Sexual or Emotional)- No  Do you feel safe in your environment? Yes        Social History     Tobacco Use     Smoking status: Never Smoker     Smokeless tobacco: Never Used   Substance Use Topics     Alcohol use: Yes     Alcohol/week: 0.0 standard drinks     If you drink alcohol do you typically have >3 drinks per day or >7 drinks per week? No    Alcohol Use 1/21/2022   Prescreen: >3 drinks/day or >7 drinks/week? -   Prescreen: >3 drinks/day or >7 drinks/week? No       Last PSA: No results found for: PSA    Reviewed orders with patient. Reviewed health maintenance and updated orders accordingly - Yes  Lab work is in process    Reviewed and updated as needed this visit by clinical staff  Tobacco  Allergies  Meds   Med Hx  Surg Hx  Fam Hx  Soc Hx       Reviewed and updated as needed this visit by Provider               History reviewed. No pertinent past medical  "history.     Review of Systems   Constitutional: Negative for chills and fever.   HENT: Negative for congestion, ear pain, hearing loss and sore throat.    Eyes: Negative for pain and visual disturbance.   Respiratory: Negative for cough and shortness of breath.    Cardiovascular: Negative for chest pain, palpitations and peripheral edema.   Gastrointestinal: Negative for abdominal pain, constipation, diarrhea, heartburn, hematochezia and nausea.   Genitourinary: Negative for dysuria, frequency, genital sores, hematuria, impotence, penile discharge and urgency.   Musculoskeletal: Negative for arthralgias, joint swelling and myalgias.   Skin: Negative for rash.   Neurological: Negative for dizziness, weakness, headaches and paresthesias.   Psychiatric/Behavioral: Negative for mood changes. The patient is not nervous/anxious.          OBJECTIVE:   /84   Pulse 72   Temp 97.7  F (36.5  C) (Tympanic)   Ht 1.845 m (6' 0.64\")   Wt 97.8 kg (215 lb 9.6 oz)   SpO2 97%   BMI 28.73 kg/m      Physical Exam  GENERAL: healthy, alert and no distress  EYES: Eyes grossly normal to inspection, PERRL and conjunctivae and sclerae normal  HENT: ear canals and TM's normal, nose and mouth without ulcers or lesions  NECK: no adenopathy, no asymmetry, masses, or scars and thyroid normal to palpation  RESP: lungs clear to auscultation - no rales, rhonchi or wheezes  CV: regular rate and rhythm, normal S1 S2, no S3 or S4, no murmur, click or rub, no peripheral edema and peripheral pulses strong  ABDOMEN: soft, nontender, no hepatosplenomegaly, no masses and bowel sounds normal   (male): Small inguinal hernia on right side easily reducible  MS: no gross musculoskeletal defects noted, no edema  SKIN: The moles on the back look harmless  SKIN: Multiple actinic keratoses on the back  NEURO: Normal strength and tone, mentation intact and speech normal  PSYCH: mentation appears normal, affect normal/bright    Diagnostic Test " "Results:  Labs reviewed in Epic    ASSESSMENT/PLAN:   (Z00.00) Routine history and physical examination of adult  (primary encounter diagnosis)  Comment: He had a colonoscopy in 2021  He is due for 1 more in 3 years  Up-to-date with vaccinations  We will check PSA  He was taking aspirin per primary prevention of CVD however in the light of new [place] PTF guidelines he does not need to be on this medication  I explained this to him  He will stop taking aspirin  Plan: Lipid panel reflex to direct LDL Fasting            (Z13.1) Screening for diabetes mellitus  Comment: He had elevated A1c in the past in the range of diabetes mellitus at 7.1 however most recent 1 was in the range of prediabetes  Plan: Hemoglobin A1c            (Z12.5) Screening for prostate cancer  Comment: Never had a PSA checked  He does have minor LUTS symptoms  Plan: PSA, screen              Patient has been advised of split billing requirements and indicates understanding: No    COUNSELING:   Reviewed preventive health counseling, as reflected in patient instructions       Regular exercise       Healthy diet/nutrition       Vision screening       Colon cancer screening       Prostate cancer screening    Estimated body mass index is 28.73 kg/m  as calculated from the following:    Height as of this encounter: 1.845 m (6' 0.64\").    Weight as of this encounter: 97.8 kg (215 lb 9.6 oz).     Weight management plan: Discussed healthy diet and exercise guidelines    He reports that he has never smoked. He has never used smokeless tobacco.  The 10-year ASCVD risk score (Anuradha SHERIFF Jr., et al., 2013) is: 17.5%    Values used to calculate the score:      Age: 61 years      Sex: Male      Is Non- : No      Diabetic: Yes      Tobacco smoker: No      Systolic Blood Pressure: 128 mmHg      Is BP treated: No      HDL Cholesterol: 50 mg/dL      Total Cholesterol: 204 mg/dL    Counseling Resources:  ATP IV Guidelines  Pooled Cohorts Equation " Calculator  FRAX Risk Assessment  ICSI Preventive Guidelines  Dietary Guidelines for Americans, 2010  USDA's MyPlate  ASA Prophylaxis  Lung CA Screening    Cuco Sorto MD  [facility]

## 2022-01-21 NOTE — PATIENT INSTRUCTIONS
Patient Education     Prevention Guidelines, Men Ages 50 to 64  Screening tests and vaccines are an important part of managing your health. A screening test is done to find diseases in people who don't have any symptoms. The goal is to find a disease early so lifestyle changes and checkups can reduce the risk of disease. Or the goal may be to detect it early to treat it most effectively. Screening tests are not used to diagnose a disease. But they are used to see if more testing is needed. Health counseling is important, too. Below are guidelines for these, for men ages 50 to 64. Keep in mind that screening recommendations vary among expert groups. Talk with your healthcare provider about which tests are best for you and to make sure you re up-to-date on what you need.   Screening  Who needs it  How often    Unhealthy alcohol use  All men in this age group  At routine exams   Blood pressure All men in this age group  Yearly checkup if your blood pressure is normal   Normal blood pressure is less than 120/80 mm Hg   If your blood pressure reading is higher than normal, follow the advice of your healthcare provider    Colorectal cancer All men at average risk in this age group  Multiple tests are available and are used at different times. Possible tests include:     Flexible sigmoidoscopy every 5 years, or    Colonoscopy every 10 years, or    CT colonography (virtual colonoscopy) every 5 years, or    Yearly fecal occult blood test, or    Yearly fecal immunochemical test every year, or    Stool DNA test, every 3 years  If you choose a test other than a colonoscopy and have an abnormal test result, you will need to follow-up with a colonoscopy. Screening recommendations advice vary varies among expert groups. Talk with your healthcare provider about which tests are best for you.   Some people should be screened using a different schedule because of their personal or family health history. Talk with your healthcare  provider about your health history.    Depression All men in this age group  At routine exams   Type 2 diabetes or prediabetes  All men beginning at age 45 and men without symptoms at any age who are overweight or obese and have 1 or more other risk factors for diabetes  At least every 3 years (yearly if your blood sugar has already begun to rise)    Type 2 diabetes All men with prediabetes  Every year   Hepatitis C Men at increased risk for infection; 1 time for those born between 1945 and 1965  At routine exams; talk with your healthcare provider.    High cholesterol or triglycerides  All men in this age group  At least every 5 years; talk with your healthcare provider about your risk    HIV All males up to age 64 and men at increased risk.  At least once up to age 64 at routine exams; talk with your healthcare provider if you are at risk    Lung cancer Men between the ages of 55 to 74 who in fairly good health and are at higher risk for lung cancer     Currently smoke or who have quit within past 15 years    30-pack year smoking history  a Eligibility criteria and age limit (possibly up to age 80) may vary across major organizations      Yearly lung cancer screening with a low-dose CT scan (LDCT); talk with your healthcare provider    Obesity All men in this age group  At yearly routine exams    BMI (body mass index) All men in this age group Every year, to help find out if you are at a healthy weight for your height    Prostate cancer Starting at age 50, talk with your healthcare provider about risks and benefits of testing with digital rectal exam (PHOEBE) and prostate-specific antigen (PSA) screening  At routine exams if you decide to be tested.    Syphilis Men at increased risk for infection  At routine exams; talk with your healthcare provider    Tuberculosis Men at increased risk for infection  Talk with your healthcare provider    Vision All men in this age group  Talk with your healthcare provider   Vaccine  Who needs it How often   Chickenpox (varicella)  All men in this age group who have no record of this infection or vaccine  2 doses; second dose should be given at least 4 weeks after the first dose    Hepatitis A Men at increased risk for infection  2 or 3 doses (depending on the vaccine) given at least 6 months apart; talk with your healthcare provider    Hepatitis B Men at increased risk for infection  2 or 3 doses (depending on the vaccine) second dose should be given 1 month after the first dose; if a third dose , it should be given at least 2 months after the second dose and at least 4 months after the first dose; talk with your healthcare provider    Haemophilus influenzae Type B (HIB)  Men at increased risk for infection  1 or 3 doses; talk with your healthcare provider    Influenza (flu) All men in this age group  Once a year   Measles, mumps, rubella (MMR)  Men in this age group born in 1957 or later who have no record of these infections or vaccines  1 or 2 doses; talk with your healthcare provider    Meningococcal ACWY (MenACWY)  Men at increased risk for infection  1 or 2 doses depending on your case. Then a booster every 5 years if you are still at risk. Talk with your healthcare provider.    Meningococcal B (MenB) Men at increased risk for infection 2 or 3 doses, depending on the vaccine and your case; talk with your healthcare provider    Pneumococcal conjugate vaccine (PCV13) and pneumococcal polysaccharide vaccine (PPSV23)  Men at increased risk for infection  PCV13: 1 dose ages 19 to 65 (protects against 13 types of pneumococcal bacteria)   PPSV23: 1 to 2 doses through age 64, (protects against 23 types of pneumococcal bacteria)    Tetanus/diphtheria/pertussis (Td/Tdap) booster All men in this age group  Td every 10 years, or a 1-time dose of Tdap instead of a Td booster after age 18, then Td every 10 years    Recombinant zoster vaccine (RZV0)  All men in this age group  2 doses; the 2nd dose is  given 2 to 6 months after the first. This is given even if you've had shingles before or had a previous zoster live vaccine    Counseling Who needs it How often   Diet and exercise Men who are overweight or obese  When diagnosed, and then at routine exams    Sexually transmitted infection prevention  Men at increased risk for infection  At routine exams; talk with your healthcare provider    Use of daily aspirin  Men ages 50 years and up in this age group who are at high risk for cardiovascular health problems and not at increased risk for bleeding as identified by their healthcare provider y  At routine exams; talk with your healthcare provider    Use of statins Men between the ages of 40 and 75 years who have     An LDL-C level of more than 70 mg/dL but less than 190 mg/dL, no diabetes and borderline to high level of risk    An LDL-C level of 190 mg/dL or greater    A diagnosis of diabetes and LDL-C level of greater than 70mg/dL At routine exams, or more often as directed by your healthcare provider. Statin dosages may vary based on your overall health, risk factors, and other health conditions such as diabetes. Talk with your healthcare provider about your risk .    Use of tobacco and the health effects it can cause  All men in this age group  Every exam   [employer] last reviewed this educational content on 5/1/2019 2000-2021 [employer]. All rights reserved. This information is not intended as a substitute for professional medical care. Always follow your healthcare professional's instructions.

## 2022-04-18 ENCOUNTER — NURSE TRIAGE (OUTPATIENT)
Dept: FAMILY MEDICINE | Facility: CLINIC | Age: 62
End: 2022-04-18
Payer: COMMERCIAL

## 2022-04-18 NOTE — TELEPHONE ENCOUNTER
"Called patient due to message below regarding hernia.     Pt was seen in clinic in January and was told he had a small inguinal hernia. Pt reports it has gotten larger since then and is starting to bother him more. Pt reports the hernia is almost 3-4 inches wife.   Pt denies abdominal pain, no pain in the scrotum, no vomiting, no nausea. Pt reports he is able to reduce the hernia with no pain or tenderness.     Pt reports that the hernia makes a \"gurling\" noise.     Per protocol, Pt should be seen in clinic within 2 weeks for a follow up with provider. Pt prefers to skip the follow up and go straight for a general surgery consult to repair the hernia.   Will route to provider to please review/advise if referral appropriate.     Advised Pt any new or worsening symptoms (fever, abdominal pain, scrotal swelling, vomiting) to be seen in ED. Pt verbalized understanding and would like a call back.     Meghna Wetzel RN, BSN  [facility]  Reason for Disposition    Previously diagnosed hernia     Pt would prefer to skip follow up with Dr. Sorto and just have general surgery consult if appropriate. Will route to provider to review.    Additional Information    Negative: [1] Swelling of scrotum AND [2] has not previously been diagnosed with a hernia    Negative: SEVERE abdominal pain    Negative: Hernia is painful or tender to touch    Negative: [1] Vomiting AND [2] can't reduce the hernia    Negative: [1] Vomiting AND [2] abdomen looks much more swollen than usual    Negative: [1] Swollen lump in groin AND [2] pulsating (like heartbeat)    Negative: Patient sounds very sick or weak to the triager    Negative: [1] Constant abdominal pain AND [2] present > 2 hours  (NO pain or tenderness of hernia)    Negative: Can't reduce the hernia (NO pain, local tenderness, or vomiting)    Negative: [1] New-onset hernia suspected (reducible bulge in groin or abdomen; non-tender) AND [2] NO pain or vomiting    Answer " "Assessment - Initial Assessment Questions  1. ONSET:  \"When did this first appear?\"      Last office visit in January.   2. APPEARANCE: \"What does it look like?\"      Lower groin, gotten bigger. Used to be able to push it back in but it is almost always bulging. Whenever Pt has a cough or sneeze it'll bulge out.   3. SIZE: \"How big is it?\" (inches, cm or compare to coins, fruit)      2-3x the size since January. About 4 inches wide.   4. LOCATION: \"Where exactly is the hernia located?\"      On the right side.   5. PATTERN: \"Does the swelling come and go, or has it been constant since it started?\"      NA  6. PAIN: \"Is there any pain?\" If so, ask: \"How bad is it?\"  (Scale 1-10; or mild, moderate, severe)      Not painful. Pt reports it is uncomfortable.   7. DIAGNOSIS: \"Have you been seen by a doctor for this?\" \"Did the doctor diagnose you as having a hernia?\"      Yes found in January.   8. OTHER SYMPTOMS: \"Do you have any other symptoms?\" (e.g., fever, abdominal pain, vomiting)      No fever, no vomiting, no nausea.   9. PREGNANCY: \"Is there any chance you are pregnant?\" \"When was your last menstrual period?\"      NA    Protocols used: HERNIA-A-AH    "

## 2022-04-18 NOTE — TELEPHONE ENCOUNTER
Reason for Call:  Other call back    Detailed comments: LAST SEEN WITH PROVIDER SMALL HERNIA, THAT HAS GOTTEN WORSE WOULD LIKE TO SEE A DR FOR THE FOLLOWING-REFFERAL    Phone Number Patient can be reached at: Cell number on file:    Telephone Information:   Mobile [phone]       Best Time: ANYTIME    Can we leave a detailed message on this number? NO    Call taken on 4/18/2022 at 11:21 AM by Farzana Livingston

## 2022-04-18 NOTE — TELEPHONE ENCOUNTER
Pt scheduled in same day slot this Thursday at 1:40pm.     Per CESARIO Gramajo to take same day slot.     Meghna Wetzel RN, BSN  [facility]

## 2022-04-21 ENCOUNTER — OFFICE VISIT (OUTPATIENT)
Dept: FAMILY MEDICINE | Facility: CLINIC | Age: 62
End: 2022-04-21
Payer: COMMERCIAL

## 2022-04-21 VITALS
OXYGEN SATURATION: 97 % | TEMPERATURE: 98.4 F | HEART RATE: 78 BPM | RESPIRATION RATE: 13 BRPM | BODY MASS INDEX: 30.22 KG/M2 | WEIGHT: 226.8 LBS | DIASTOLIC BLOOD PRESSURE: 89 MMHG | SYSTOLIC BLOOD PRESSURE: 138 MMHG

## 2022-04-21 DIAGNOSIS — K40.90 RIGHT INGUINAL HERNIA: Primary | ICD-10-CM

## 2022-04-21 PROCEDURE — 99213 OFFICE O/P EST LOW 20 MIN: CPT | Performed by: INTERNAL MEDICINE

## 2022-04-21 NOTE — PATIENT INSTRUCTIONS
At [facility], we strive to deliver an exceptional experience to you, every time we see you. If you receive a survey, please complete it as we do value your feedback.  If you have MyChart, you can expect to receive results automatically within 24 hours of their completion.  Your provider will send a note interpreting your results as well.   If you do not have MyChart, you should receive your results in about a week by mail.    Your care team:                            Family Medicine Internal Medicine   MD Kee Manning MD Shantel Branch-Fleming, MD Srinivasa Vaka, MD Katya Belousova, EMMA Dumont CNP, MD ([facility]   Kit Mendiola, MD Vi Romano MD Amelia Massimini APRN CNP Kim Thein, APRN CNP Bethany Templen, MD             Clinic hours: Monday - Thursday 7 am-6 pm; Fridays 7 am-5 pm.   Urgent care: Monday - Friday 10 am- 8 pm; Saturday and Sunday 9 am-5 pm.    Clinic: [phone]       [facility]: Monday - Thursday 8 am - 7 pm; Friday 8 am - 6 pm  [facility]: [phone]

## 2022-04-21 NOTE — PROGRESS NOTES
Assessment & Plan     Right inguinal hernia  Complaining of increased swelling in the right groin  The swelling was present around 3 months ago when I first examined him  It is now getting bigger  Examination is consistent with right inguinal hernia  Certainly has gotten bigger in the last 3 months  Is still easily reducible  However considering the size of the hernia I think it is prudent to make a referral for surgery  - Adult General Surg Referral; Future      20 minutes spent on the date of the encounter doing chart review, history and exam, documentation and further activities per the note           Return in about 3 months (around 7/21/2022).    Cuco Sorto MD  [facility] is a 61 year old who presents for the following health issues     History of Present Illness       Reason for visit:  Hernia check  Symptom onset:  More than a month  Symptoms include:  Bulge in groin area  Symptom intensity:  Mild    He eats 4 or more servings of fruits and vegetables daily.He consumes 0 sweetened beverage(s) daily.He exercises with enough effort to increase his heart rate 10 to 19 minutes per day.  He exercises with enough effort to increase his heart rate 3 or less days per week.   He is taking medications regularly.             Review of Systems   Constitutional, HEENT, cardiovascular, pulmonary, gi and gu systems are negative, except as otherwise noted.      Objective    /89 (BP Location: Right arm, Patient Position: Sitting, Cuff Size: Adult Large)   Pulse 78   Temp 98.4  F (36.9  C) (Tympanic)   Resp 13   Wt 102.9 kg (226 lb 12.8 oz)   SpO2 97%   BMI 30.22 kg/m    Body mass index is 30.22 kg/m .  Physical Exam   GENERAL: healthy, alert and no distress  EYES: Eyes grossly normal to inspection, PERRL and conjunctivae and sclerae normal  ABDOMEN: Right inguinal hernia noted  Easily reducible  MS: no gross musculoskeletal defects noted, no edema  PSYCH:  mentation appears normal, affect normal/bright

## 2022-04-24 ENCOUNTER — HEALTH MAINTENANCE LETTER (OUTPATIENT)
Age: 62
End: 2022-04-24

## 2022-04-27 ENCOUNTER — TELEPHONE (OUTPATIENT)
Dept: SURGERY | Facility: CLINIC | Age: 62
End: 2022-04-27

## 2022-04-27 ENCOUNTER — OFFICE VISIT (OUTPATIENT)
Dept: SURGERY | Facility: CLINIC | Age: 62
End: 2022-04-27
Attending: INTERNAL MEDICINE
Payer: COMMERCIAL

## 2022-04-27 VITALS
SYSTOLIC BLOOD PRESSURE: 150 MMHG | WEIGHT: 225 LBS | HEART RATE: 78 BPM | BODY MASS INDEX: 30.48 KG/M2 | HEIGHT: 72 IN | DIASTOLIC BLOOD PRESSURE: 81 MMHG

## 2022-04-27 DIAGNOSIS — K40.90 RIGHT INGUINAL HERNIA: ICD-10-CM

## 2022-04-27 PROCEDURE — 99203 OFFICE O/P NEW LOW 30 MIN: CPT | Performed by: SURGERY

## 2022-04-27 NOTE — LETTER
4/27/2022         RE: Mor Henry  [address]  [place] [address]        Dear Colleague,    Thank you for referring your patient, Mor Henry, to the [facility]. Please see a copy of my visit note below.    Patient seen in consultation for right inguinal hernia by Julia Kraus    HPI:  Patient is a 61 year old male  with complaints of bulge/lump in right groin  The patient noticed the symptoms about 3 months ago.    Started small, reducible. Has enlarged in these months, can get to be about baseball size.  Some discomfort, maybe 1/10  No difficulties with flatus or BMs  nothing makes the episode better.  Left side feels normal. No previous repair  Patient has not family history of hernia problems    Review Of Systems    Skin: negative  Ears/Nose/Throat: negative  Respiratory: No shortness of breath, dyspnea on exertion, cough, or hemoptysis  Cardiovascular: negative  Gastrointestinal: negative  Genitourinary: frequency  Musculoskeletal: as above  Neurologic: negative  Hematologic/Lymphatic/Immunologic: negative  Endocrine: diabetes, not on any meds, was able to get A1C to 5.8      No past medical history on file.   Patient Active Problem List   Diagnosis     CARDIOVASCULAR SCREENING; LDL GOAL LESS THAN 160     Obesity, Class I, BMI 30-34.9     Advanced directives, counseling/discussion     Impaired fasting glucose     Diverticulosis of large intestine     Diabetes mellitus, type 2 (H)     Hyperlipidemia LDL goal <100       Past Surgical History:   Procedure Laterality Date     COLONOSCOPY N/A 10/5/2015    Procedure: COLONOSCOPY;  Surgeon: Emile Holloway MD;  Location: [facility] OR     COLONOSCOPY WITH CO2 INSUFFLATION N/A 10/5/2015    Procedure: COLONOSCOPY WITH CO2 INSUFFLATION;  Surgeon: Emile Holloway MD;  Location: [facility] OR     COLONOSCOPY WITH CO2 INSUFFLATION N/A 4/26/2021    Procedure: COLONOSCOPY, WITH CO2 INSUFFLATION;  Surgeon:  Emile Holloway MD;  Location:  OR       Social History     Socioeconomic History     Marital status: Single     Spouse name: Not on file     Number of children: Not on file     Years of education: Not on file     Highest education level: Not on file   Occupational History     Not on file   Tobacco Use     Smoking status: Never Smoker     Smokeless tobacco: Never Used   Substance and Sexual Activity     Alcohol use: Yes     Alcohol/week: 0.0 standard drinks     Drug use: No     Sexual activity: Yes     Partners: Female   Other Topics Concern     Not on file   Social History Narrative     Not on file     Social Determinants of Health     Financial Resource Strain: Not on file   Food Insecurity: Not on file   Transportation Needs: Not on file   Physical Activity: Not on file   Stress: Not on file   Social Connections: Not on file   Intimate Partner Violence: Not on file   Housing Stability: Not on file       Current Outpatient Medications   Medication Sig Dispense Refill     atorvastatin (LIPITOR) 20 MG tablet Take 1 tablet (20 mg) by mouth daily For cholesterol 90 tablet 3     sildenafil (REVATIO) 20 MG tablet Take 1-5 tablets by mouth 2 hours before intercourse. Maximum 100 mg in 24 hours. 30 tablet 2       Medications and history reviewed    Physical exam:  Vitals: BP (!) 150/81   Pulse 78   Ht 1.829 m (6')   Wt 102.1 kg (225 lb)   BMI 30.52 kg/m    BMI= Body mass index is 30.52 kg/m .    Constitutional: healthy, alert and no distress  Head: Normocephalic. No masses, lesions, tenderness or abnormalities  Cardiovascular: negative, PMI normal. No lifts, heaves, or thrills. RRR. No murmurs, clicks gallops or rub  Respiratory: negative, Percussion normal. Good diaphragmatic excursion. Lungs clear  Gastrointestinal: Abdomen soft, non-tender. BS normal. No masses, organomegaly, positive findings: obese  : Normal external genitalia without lesions and male positive for small reducible right inguinal  hernia  Musculoskeletal: extremities normal- no gross deformities noted, gait normal and normal muscle tone  Skin: no suspicious lesions or rashes  Psychiatric: mentation appears normal and affect normal/bright      Assessment:     ICD-10-CM    1. Right inguinal hernia  K40.90 Adult General Surg Referral     Plan: Small right inguinal hernia with some minimal symptoms.  Patient desires repair and try to get done while he still has insurance through work.  I offered a robotic approach to repair.  Risks of surgery discussed including, but not limited to bleeding, infection, recurrence, damage to intra-abdominal contents such as nerves, blood vessels, bowel or other organs.  Risks of anesthesia also discussed.  Although mesh is a better long term repair if it gets infected it must be removed. Mesh problems such as fracture, erosion or adhesions are possible.  If there is evidence of an infection at time of surgery it will be cancelled and rescheduled to when well.    Discussed massaging hernia back in and using ice if becomes more painful.  If not able to reduce then go to emergency room.  We will work on scheduling for him.    Patric Gracia MD        Again, thank you for allowing me to participate in the care of your patient.        Sincerely,        Patric Gracia MD

## 2022-04-27 NOTE — PROGRESS NOTES
Patient seen in consultation for right inguinal hernia by Julia Kraus    HPI:  Patient is a 61 year old male  with complaints of bulge/lump in right groin  The patient noticed the symptoms about 3 months ago.    Started small, reducible. Has enlarged in these months, can get to be about baseball size.  Some discomfort, maybe 1/10  No difficulties with flatus or BMs  nothing makes the episode better.  Left side feels normal. No previous repair  Patient has not family history of hernia problems    Review Of Systems    Skin: negative  Ears/Nose/Throat: negative  Respiratory: No shortness of breath, dyspnea on exertion, cough, or hemoptysis  Cardiovascular: negative  Gastrointestinal: negative  Genitourinary: frequency  Musculoskeletal: as above  Neurologic: negative  Hematologic/Lymphatic/Immunologic: negative  Endocrine: diabetes, not on any meds, was able to get A1C to 5.8      No past medical history on file.   Patient Active Problem List   Diagnosis     CARDIOVASCULAR SCREENING; LDL GOAL LESS THAN 160     Obesity, Class I, BMI 30-34.9     Advanced directives, counseling/discussion     Impaired fasting glucose     Diverticulosis of large intestine     Diabetes mellitus, type 2 (H)     Hyperlipidemia LDL goal <100       Past Surgical History:   Procedure Laterality Date     COLONOSCOPY N/A 10/5/2015    Procedure: COLONOSCOPY;  Surgeon: Emile Holloway MD;  Location: [facility]     COLONOSCOPY WITH CO2 INSUFFLATION N/A 10/5/2015    Procedure: COLONOSCOPY WITH CO2 INSUFFLATION;  Surgeon: Emile Holloway MD;  Location: [facility]     COLONOSCOPY WITH CO2 INSUFFLATION N/A 4/26/2021    Procedure: COLONOSCOPY, WITH CO2 INSUFFLATION;  Surgeon: Emile Holloway MD;  Location: [facility]       Social History     Socioeconomic History     Marital status: Single     Spouse name: Not on file     Number of children: Not on file     Years of education: Not on file     Highest education level: Not on file   Occupational  History     Not on file   Tobacco Use     Smoking status: Never Smoker     Smokeless tobacco: Never Used   Substance and Sexual Activity     Alcohol use: Yes     Alcohol/week: 0.0 standard drinks     Drug use: No     Sexual activity: Yes     Partners: Female   Other Topics Concern     Not on file   Social History Narrative     Not on file     Social Determinants of Health     Financial Resource Strain: Not on file   Food Insecurity: Not on file   Transportation Needs: Not on file   Physical Activity: Not on file   Stress: Not on file   Social Connections: Not on file   Intimate Partner Violence: Not on file   Housing Stability: Not on file       Current Outpatient Medications   Medication Sig Dispense Refill     atorvastatin (LIPITOR) 20 MG tablet Take 1 tablet (20 mg) by mouth daily For cholesterol 90 tablet 3     sildenafil (REVATIO) 20 MG tablet Take 1-5 tablets by mouth 2 hours before intercourse. Maximum 100 mg in 24 hours. 30 tablet 2       Medications and history reviewed    Physical exam:  Vitals: BP (!) 150/81   Pulse 78   Ht 1.829 m (6')   Wt 102.1 kg (225 lb)   BMI 30.52 kg/m    BMI= Body mass index is 30.52 kg/m .    Constitutional: healthy, alert and no distress  Head: Normocephalic. No masses, lesions, tenderness or abnormalities  Cardiovascular: negative, PMI normal. No lifts, heaves, or thrills. RRR. No murmurs, clicks gallops or rub  Respiratory: negative, Percussion normal. Good diaphragmatic excursion. Lungs clear  Gastrointestinal: Abdomen soft, non-tender. BS normal. No masses, organomegaly, positive findings: obese  : Normal external genitalia without lesions and male positive for small reducible right inguinal hernia  Musculoskeletal: extremities normal- no gross deformities noted, gait normal and normal muscle tone  Skin: no suspicious lesions or rashes  Psychiatric: mentation appears normal and affect normal/bright      Assessment:     ICD-10-CM    1. Right inguinal hernia  K40.90  Adult General Surg Referral     Plan: Small right inguinal hernia with some minimal symptoms.  Patient desires repair and try to get done while he still has insurance through work.  I offered a robotic approach to repair.  Risks of surgery discussed including, but not limited to bleeding, infection, recurrence, damage to intra-abdominal contents such as nerves, blood vessels, bowel or other organs.  Risks of anesthesia also discussed.  Although mesh is a better long term repair if it gets infected it must be removed. Mesh problems such as fracture, erosion or adhesions are possible.  If there is evidence of an infection at time of surgery it will be cancelled and rescheduled to when well.    Discussed massaging hernia back in and using ice if becomes more painful.  If not able to reduce then go to emergency room.  We will work on scheduling for him.    Patric Gracia MD

## 2022-04-27 NOTE — TELEPHONE ENCOUNTER
[facility] SURGERY PLANNING/SCHEDULING WORKSHEET                                                     Mor Henry                :  1960  MRN:  [id number]  Home Phone [phone]   Work Phone Not on file.   Mobile [phone]         Surgeon: Patric Gracia MD    Diagnosis/Reason for Procedure:   Right inguinal hernia    Procedure Name: Robotic assisted laparoscopic right inguinal hernia repair, possible bilateral, possible open    Laterality:  right     Body Area: Inguinal    Urgency of Surgery:  Tier: Tier 3 will need to try to get done before end of May due to possible loss of insurance through work    Surgery Location:  [facility]  Why can't this outpatient surgery be done at the [facility] or [facility]?  Robotic  Patient Surgery Class:  SDS  Length of Procedure:  60 minutes  Type of anesthesia:  [facility] Anesthesia: General    Multi-surgeon case: No  OR Assistant needed:   No  Vendor needed: No  Positioning: Supine    Special Equipment: None  Reservable OR Equipment Needed:  Reservable OR Equipment: None  Special Instructions:  [facility] special instructions: Per the [facility] Pre-Admission Nurse when they call the patient prior to the surgery date.   :  NOT NEEDED    Preop: Pre-op options: PCP   Postop evaluation needed:  2 weeks    ALLERGIES: No Known Allergies   BMI:There is no height or weight on file to calculate BMI.   30.52      Patric Gracia MD    2022

## 2022-04-28 ENCOUNTER — TELEPHONE (OUTPATIENT)
Dept: SURGERY | Facility: CLINIC | Age: 62
End: 2022-04-28
Payer: COMMERCIAL

## 2022-04-28 NOTE — TELEPHONE ENCOUNTER
Type of surgery: Robotic assisted laparoscopic right inguinal hernia repair,possible bilateral,possible open  CPT 60363, POSS 07052   Right inguinal hernia K40.90    Location of surgery: [facility]  Date and time of surgery: 5-10-22  1:45pm  Surgeon: Dr Gracia  Pre-Op Appt Date: 5-5-22  Post-Op Appt Date: 5-25-22   Packet sent out: Yes  Pre-cert/Authorization completed:  Per automated system, no prior auth required for this [employer] plan. Confirmation#s [id number], [id number]    Date: 4-28-22    Selene Reynoso  Prior Authorization Dept  [phone]

## 2022-05-04 ENCOUNTER — OFFICE VISIT (OUTPATIENT)
Dept: FAMILY MEDICINE | Facility: CLINIC | Age: 62
End: 2022-05-04
Payer: COMMERCIAL

## 2022-05-04 VITALS
BODY MASS INDEX: 30.85 KG/M2 | WEIGHT: 227.8 LBS | TEMPERATURE: 98.6 F | HEART RATE: 78 BPM | RESPIRATION RATE: 18 BRPM | SYSTOLIC BLOOD PRESSURE: 142 MMHG | DIASTOLIC BLOOD PRESSURE: 91 MMHG | HEIGHT: 72 IN | OXYGEN SATURATION: 96 %

## 2022-05-04 DIAGNOSIS — E11.9 TYPE 2 DIABETES MELLITUS WITHOUT COMPLICATION, WITHOUT LONG-TERM CURRENT USE OF INSULIN (H): ICD-10-CM

## 2022-05-04 DIAGNOSIS — Z01.818 PREOP GENERAL PHYSICAL EXAM: Primary | ICD-10-CM

## 2022-05-04 DIAGNOSIS — K40.90 RIGHT INGUINAL HERNIA: ICD-10-CM

## 2022-05-04 DIAGNOSIS — E78.5 HYPERLIPIDEMIA LDL GOAL <100: ICD-10-CM

## 2022-05-04 PROCEDURE — 93000 ELECTROCARDIOGRAM COMPLETE: CPT | Performed by: FAMILY MEDICINE

## 2022-05-04 PROCEDURE — 99214 OFFICE O/P EST MOD 30 MIN: CPT | Performed by: FAMILY MEDICINE

## 2022-05-04 ASSESSMENT — PAIN SCALES - GENERAL: PAINLEVEL: NO PAIN (0)

## 2022-05-04 NOTE — PATIENT INSTRUCTIONS
Preparing for Your Surgery  Getting started  A nurse will call you to review your health history and instructions. They will give you an arrival time based on your scheduled surgery time. Please be ready to share:    Your doctor's clinic name and phone number    Your medical, surgical and anesthesia history    A list of allergies and sensitivities    A list of medicines, including herbal treatments and over-the-counter drugs    Whether the patient has a legal guardian (ask how to send us the papers in advance)  Please tell us if you're pregnant--or if there's any chance you might be pregnant. Some surgeries may injure a fetus (unborn baby), so they require a pregnancy test. Surgeries that are safe for a fetus don't always need a test, and you can choose whether to have one.   If you have a child who's having surgery, please ask for a copy of Preparing for Your Child's Surgery.    Preparing for surgery    Within 30 days of surgery: Have a pre-op exam (sometimes called an H&P, or History and Physical). This can be done at a clinic or pre-operative center.  ? If you're having a , you may not need this exam. Talk to your care team.    At your pre-op exam, talk to your care team about all medicines you take. If you need to stop any medicines before surgery, ask when to start taking them again.  ? We do this for your safety. Many medicines can make you bleed too much during surgery. Some change how well surgery (anesthesia) drugs work.    Call your insurance company to let them know you're having surgery. (If you don't have insurance, call [phone].)    Call your clinic if there's any change in your health. This includes signs of a cold or flu (sore throat, runny nose, cough, rash, fever). It also includes a scrape or scratch near the surgery site.    If you have questions on the day of surgery, call your hospital or surgery center.  COVID testing  You may need to be tested for COVID-19 before having  surgery. If so, we will give you instructions.  Eating and drinking guidelines  For your safety: Unless your surgeon tells you otherwise, follow the guidelines below.    Eat and drink as usual until 8 hours before surgery. After that, no food or milk.    Drink clear liquids until 2 hours before surgery. These are liquids you can see through, like water, Gatorade and Propel Water. You may also have black coffee and tea (no cream or milk).    Nothing by mouth within 2 hours of surgery. This includes gum, candy and breath mints.    If you drink alcohol: Stop drinking it the night before surgery.    If your care team tells you to take medicine on the morning of surgery, it's okay to take it with a sip of water.  Preventing infection    Shower or bathe the night before and morning of your surgery. Follow the instructions your clinic gave you. (If no instructions, use regular soap.)    Don't shave or clip hair near your surgery site. We'll remove the hair if needed.    Don't smoke or vape the morning of surgery. You may chew nicotine gum up to 2 hours before surgery. A nicotine patch is okay.  ? Note: Some surgeries require you to completely quit smoking and nicotine. Check with your surgeon.    Your care team will make every effort to keep you safe from infection. We will:  ? Clean our hands often with soap and water (or an alcohol-based hand rub).  ? Clean the skin at your surgery site with a special soap that kills germs.  ? Give you a special gown to keep you warm. (Cold raises the risk of infection.)  ? Wear special hair covers, masks, gowns and gloves during surgery.  ? Give antibiotic medicine, if prescribed. Not all surgeries need antibiotics.  What to bring on the day of surgery    Photo ID and insurance card    Copy of your health care directive, if you have one    Glasses and hearing aides (bring cases)  ? You can't wear contacts during surgery    Inhaler and eye drops, if you use them (tell us about these when  you arrive)    CPAP machine or breathing device, if you use them    A few personal items, if spending the night    If you have . . .  ? A pacemaker, ICD (cardiac defibrillator) or other implant: Bring the ID card.  ? An implanted stimulator: Bring the remote control.  ? A legal guardian: Bring a copy of the certified (court-stamped) guardianship papers.  Please remove any jewelry, including body piercings. Leave jewelry and other valuables at home.  If you're going home the day of surgery    You must have a responsible adult drive you home. They should stay with you overnight as well.    If you don't have someone to stay with you, and you aren't safe to go home alone, we may keep you overnight. Insurance often won't pay for this.  After surgery  If it's hard to control your pain or you need more pain medicine, please call your surgeon's office.  Questions?   If you have any questions for your care team, list them here: _________________________________________________________________________________________________________________________________________________________________________ ____________________________________ ____________________________________ ____________________________________  For informational purposes only. Not to replace the advice of your health care provider. Copyright   2003, 2019 [facility]. All rights reserved. Clinically reviewed by Kim Noriega MD. [employer] [id number] - REV 07/21.

## 2022-05-04 NOTE — PROGRESS NOTES
[address] [address]  Phone: [phone]  Primary Provider: Julia Kraus  Pre-op Performing Provider: JAYSHREE SPANGLER      PREOPERATIVE EVALUATION:  Today's date: 5/4/2022    Mor Henry is a 61 year old male who presents for a preoperative evaluation.    Surgical Information:  Surgery/Procedure: Groin Hernia repair  Surgery Location: [facility]  Surgeon: Dr. Gracia  Surgery Date: 05/10/22  Time of Surgery: 12:00pm  Where patient plans to recover: At home with family  Fax number for surgical facility: Note does not need to be faxed, will be available electronically in Epic.    Type of Anesthesia Anticipated: General    Assessment & Plan     The proposed surgical procedure is considered INTERMEDIATE risk.    Preop general physical exam  - EKG 12-lead complete w/read - Clinics    Right inguinal hernia    Type 2 diabetes mellitus without complication, without long-term current use of insulin (H)  Diet controlled with most recent A1c 6.0    Hyperlipidemia LDL goal <100       Risks and Recommendations:  The patient has the following additional risks and recommendations for perioperative complications:   - No identified additional risk factors other than previously addressed    Medication Instructions:  Patient is to take all scheduled medications on the day of surgery    RECOMMENDATION:  APPROVAL GIVEN to proceed with proposed procedure, without further diagnostic evaluation.      Subjective     HPI related to upcoming procedure:   Right inguinal hernia.  Planned repair.    Preop Questions 5/4/2022   1. Have you ever had a heart attack or stroke? No   2. Have you ever had surgery on your heart or blood vessels, such as a stent placement, a coronary artery bypass, or surgery on an artery in your head, neck, heart, or legs? No   3. Do you have chest pain with activity? No   4. Do you have a history of  heart failure?  No   5. Do you currently have a cold, bronchitis or symptoms of other infection? No   6. Do you have a cough, shortness of breath, or wheezing? No   7. Do you or anyone in your family have previous history of blood clots? No   8. Do you or does anyone in your family have a serious bleeding problem such as prolonged bleeding following surgeries or cuts? No   9. Have you ever had problems with anemia or been told to take iron pills? No   10. Have you had any abnormal blood loss such as black, tarry or bloody stools? No   11. Have you ever had a blood transfusion? No   12. Are you willing to have a blood transfusion if it is medically needed before, during, or after your surgery? Yes   13. Have you or any of your relatives ever had problems with anesthesia? No   14. Do you have sleep apnea, excessive snoring or daytime drowsiness? No   15. Do you have any artifical heart valves or other implanted medical devices like a pacemaker, defibrillator, or continuous glucose monitor? No   16. Do you have artificial joints? No   17. Are you allergic to latex? No       Health Care Directive:  Patient does not have a Health Care Directive or Living Will: Patient states has Advance Directive and will bring in a copy to clinic.    Preoperative Review of :   reviewed - no record of controlled substances prescribed.      Status of Chronic Conditions:  See problem list for active medical problems.  Problems all longstanding and stable, except as noted/documented.  See ROS for pertinent symptoms related to these conditions.    DIABETES - Patient has a longstanding history of DiabetesType Type II . Patient is being treated with none and denies significant side effects. Control has been good. Complicating factors include but are not limited to: hyperlipidemia.     HYPERLIPIDEMIA - Patient has a long history of significant Hyperlipidemia requiring medication for treatment with recent good control. Patient reports no problems or side  effects with the medication.       Review of Systems  CONSTITUTIONAL: NEGATIVE for fever, chills, change in weight  INTEGUMENTARY/SKIN: NEGATIVE for worrisome rashes, moles or lesions  EYES: NEGATIVE for vision changes or irritation  ENT/MOUTH: NEGATIVE for ear, mouth and throat problems  RESP: NEGATIVE for significant cough or SOB  CV: NEGATIVE for chest pain, palpitations or peripheral edema  GI: NEGATIVE for nausea, abdominal pain, heartburn, or change in bowel habits  : NEGATIVE for frequency, dysuria, or hematuria  MUSCULOSKELETAL: NEGATIVE for significant arthralgias or myalgia  NEURO: NEGATIVE for weakness, dizziness or paresthesias  ENDOCRINE: NEGATIVE for temperature intolerance, skin/hair changes  HEME: NEGATIVE for bleeding problems  PSYCHIATRIC: NEGATIVE for changes in mood or affect    Patient Active Problem List    Diagnosis Date Noted     Diabetes mellitus, type 2 (H) 03/16/2021     Priority: Medium     Hyperlipidemia LDL goal <100 03/16/2021     Priority: Medium     Diverticulosis of large intestine 10/06/2015     Priority: Medium     On colonoscopy done 10/5/15.       CARDIOVASCULAR SCREENING; LDL GOAL LESS THAN 160 08/25/2015     Priority: Medium     Obesity, Class I, BMI 30-34.9 08/25/2015     Priority: Medium     Advanced directives, counseling/discussion 08/25/2015     Priority: Medium     Information materials provided        History reviewed. No pertinent past medical history.  Past Surgical History:   Procedure Laterality Date     COLONOSCOPY N/A 10/5/2015    Procedure: COLONOSCOPY;  Surgeon: Emile Holloway MD;  Location: [facility] OR     COLONOSCOPY WITH CO2 INSUFFLATION N/A 10/5/2015    Procedure: COLONOSCOPY WITH CO2 INSUFFLATION;  Surgeon: Emile Holloway MD;  Location: [facility]     COLONOSCOPY WITH CO2 INSUFFLATION N/A 4/26/2021    Procedure: COLONOSCOPY, WITH CO2 INSUFFLATION;  Surgeon: Emile Holloway MD;  Location: [facility] OR     Current Outpatient Medications   Medication  Sig Dispense Refill     atorvastatin (LIPITOR) 20 MG tablet Take 1 tablet (20 mg) by mouth daily For cholesterol 90 tablet 3     sildenafil (REVATIO) 20 MG tablet Take 1-5 tablets by mouth 2 hours before intercourse. Maximum 100 mg in 24 hours. 30 tablet 2       No Known Allergies     Social History     Tobacco Use     Smoking status: Never Smoker     Smokeless tobacco: Never Used   Substance Use Topics     Alcohol use: Yes     Family History   Problem Relation Age of Onset     Diabetes Mother      Hypertension Father      Hyperlipidemia Father      Diabetes Sister      Diabetes Brother      History   Drug Use No         Objective     BP (!) 142/91 (BP Location: Right arm, Patient Position: Sitting, Cuff Size: Adult Large)   Pulse 78   Temp 98.6  F (37  C) (Oral)   Resp 18   Ht 1.829 m (6')   Wt 103.3 kg (227 lb 12.8 oz)   SpO2 96%   BMI 30.90 kg/m      Physical Exam    GENERAL APPEARANCE: healthy, alert and no distress     EYES: EOMI,  PERRL     HENT: ear canals and TM's normal and nose and mouth without ulcers or lesions     NECK: no adenopathy, no asymmetry, masses, or scars and thyroid normal to palpation     RESP: lungs clear to auscultation - no rales, rhonchi or wheezes     CV: regular rates and rhythm, normal S1 S2, no S3 or S4 and no murmur, click or rub     ABDOMEN:  soft, nontender, no HSM or masses and bowel sounds normal     MS: extremities normal- no gross deformities noted, no evidence of inflammation in joints, FROM in all extremities.     SKIN: no suspicious lesions or rashes     NEURO: Normal strength and tone, sensory exam grossly normal, mentation intact and speech normal     PSYCH: mentation appears normal. and affect normal/bright     LYMPHATICS: No cervical adenopathy    Recent Labs   Lab Test 01/21/22  0849 09/14/21  1629 03/09/21  0843   NA  --  139 140   POTASSIUM  --  3.8 4.5   CR  --  0.74 0.86   A1C 6.0* 5.8* 7.1*        Diagnostics:  No labs were ordered during this visit.   EKG:  appears normal, NSR, normal axis, normal intervals, no acute ST/T changes c/w ischemia, no LVH by voltage criteria    Revised Cardiac Risk Index (RCRI):  The patient has the following serious cardiovascular risks for perioperative complications:   - No serious cardiac risks = 0 points     RCRI Interpretation: 0 points: Class I (very low risk - 0.4% complication rate)           Signed Electronically by: Tracy Elam MD  Copy of this evaluation report is provided to requesting physician.

## 2022-05-25 ENCOUNTER — OFFICE VISIT (OUTPATIENT)
Dept: SURGERY | Facility: CLINIC | Age: 62
End: 2022-05-25
Payer: COMMERCIAL

## 2022-05-25 VITALS
DIASTOLIC BLOOD PRESSURE: 88 MMHG | WEIGHT: 227 LBS | HEART RATE: 67 BPM | SYSTOLIC BLOOD PRESSURE: 149 MMHG | BODY MASS INDEX: 30.79 KG/M2

## 2022-05-25 DIAGNOSIS — Z87.19 S/P BILATERAL INGUINAL HERNIA REPAIR, FOLLOW-UP EXAM: Primary | ICD-10-CM

## 2022-05-25 DIAGNOSIS — Z09 S/P BILATERAL INGUINAL HERNIA REPAIR, FOLLOW-UP EXAM: Primary | ICD-10-CM

## 2022-05-25 DIAGNOSIS — R60.9 POSTOPERATIVE EDEMA: ICD-10-CM

## 2022-05-25 PROCEDURE — 99024 POSTOP FOLLOW-UP VISIT: CPT | Performed by: SURGERY

## 2022-05-25 NOTE — PROGRESS NOTES
General Surgery Post Op    Pt returns for follow up visit s/p robotic bilateral inguinal hernia repair on 5/10/2022.  Right side was the symptomatic side and was that the larger between the 2.    Patient has been doing well, tolerating diet. Bowels moving well. Pain controlled. No issues with wound healing/redness/drainage. No fevers.  A few days after surgery he started noticed some swelling in the right groin and into the testicle.  It can be a bit achy but not painful.  Did not have any significant bruising.  Incisions themselves have been doing fine no concerns.    Physical exam: Vitals: BP (!) 149/88   Pulse 67   Wt 103 kg (227 lb)   BMI 30.79 kg/m    BMI= Body mass index is 30.79 kg/m .    Exam:  Constitutional: healthy, alert and no distress  Gastrointestinal: Abdomen soft, non-tender. BS normal. No masses, organomegaly  The soft tissue in the right groin has some swelling compared to the left.  Palpation reveals edematous cord and extending down to the scrotum with swelling around the right testicle as well.  This is soft and not tender.  With patient standing and cough I do not feel that there is any recurrence of hernia.  The left groin appears normal.      Assessment:     ICD-10-CM    1. S/P bilateral inguinal hernia repair, follow-up exam  Z09    2. Postoperative edema  R60.9      Plan: He has been recovering well for the most part with little pain or other concerns aside from the swelling.  On exam this seems to be soft tissue and cord edema likely related to the larger hernia sac and more dissection required on the right side.  This does not feel like a discrete fluid collection such as a seroma or hematoma but just diffuse edema in the tissues themselves.  Advised him to try to keep the scrotum elevated when at home and resting and lying down which can help with some of that swelling.  More time should help as well.  If he is starting to notice things worsen or some other concern let me know  otherwise we will plan to see him back in a few weeks to recheck.    Patric Gracia MD

## 2022-05-25 NOTE — LETTER
5/25/2022         RE: Mor Henry  [address]  [place] [address]        Dear Colleague,    Thank you for referring your patient, Mor Henry, to the [facility]. Please see a copy of my visit note below.    General Surgery Post Op    Pt returns for follow up visit s/p robotic bilateral inguinal hernia repair on 5/10/2022.  Right side was the symptomatic side and was that the larger between the 2.    Patient has been doing well, tolerating diet. Bowels moving well. Pain controlled. No issues with wound healing/redness/drainage. No fevers.  A few days after surgery he started noticed some swelling in the right groin and into the testicle.  It can be a bit achy but not painful.  Did not have any significant bruising.  Incisions themselves have been doing fine no concerns.    Physical exam: Vitals: BP (!) 149/88   Pulse 67   Wt 103 kg (227 lb)   BMI 30.79 kg/m    BMI= Body mass index is 30.79 kg/m .    Exam:  Constitutional: healthy, alert and no distress  Gastrointestinal: Abdomen soft, non-tender. BS normal. No masses, organomegaly  The soft tissue in the right groin has some swelling compared to the left.  Palpation reveals edematous cord and extending down to the scrotum with swelling around the right testicle as well.  This is soft and not tender.  With patient standing and cough I do not feel that there is any recurrence of hernia.  The left groin appears normal.      Assessment:     ICD-10-CM    1. S/P bilateral inguinal hernia repair, follow-up exam  Z09    2. Postoperative edema  R60.9      Plan: He has been recovering well for the most part with little pain or other concerns aside from the swelling.  On exam this seems to be soft tissue and cord edema likely related to the larger hernia sac and more dissection required on the right side.  This does not feel like a discrete fluid collection such as a seroma or hematoma but just diffuse edema in the  tissues themselves.  Advised him to try to keep the scrotum elevated when at home and resting and lying down which can help with some of that swelling.  More time should help as well.  If he is starting to notice things worsen or some other concern let me know otherwise we will plan to see him back in a few weeks to recheck.    Patric Gracia MD          Again, thank you for allowing me to participate in the care of your patient.        Sincerely,        Patric Gracia MD

## 2022-06-08 ENCOUNTER — OFFICE VISIT (OUTPATIENT)
Dept: SURGERY | Facility: CLINIC | Age: 62
End: 2022-06-08
Payer: COMMERCIAL

## 2022-06-08 VITALS
BODY MASS INDEX: 30.79 KG/M2 | WEIGHT: 227 LBS | DIASTOLIC BLOOD PRESSURE: 90 MMHG | SYSTOLIC BLOOD PRESSURE: 149 MMHG | HEART RATE: 85 BPM

## 2022-06-08 DIAGNOSIS — R60.9 POSTOPERATIVE EDEMA: ICD-10-CM

## 2022-06-08 DIAGNOSIS — Z09 S/P BILATERAL INGUINAL HERNIA REPAIR, FOLLOW-UP EXAM: Primary | ICD-10-CM

## 2022-06-08 DIAGNOSIS — Z87.19 S/P BILATERAL INGUINAL HERNIA REPAIR, FOLLOW-UP EXAM: Primary | ICD-10-CM

## 2022-06-08 DIAGNOSIS — M96.840 POSTOPERATIVE HEMATOMA OF MUSCULOSKELETAL STRUCTURE FOLLOWING MUSCULOSKELETAL PROCEDURE: ICD-10-CM

## 2022-06-08 PROCEDURE — 99024 POSTOP FOLLOW-UP VISIT: CPT | Performed by: SURGERY

## 2022-06-08 NOTE — LETTER
6/8/2022         RE: Mor Henry  [address]  [place] [address]        Dear Colleague,    Thank you for referring your patient, Mor Henry, to the [facility]. Please see a copy of my visit note below.    General Surgery Post Op    Pt returns for follow up visit for right groin swelling after bilateral inguinal hernia repair.    Patient had bilateral robotic inguinal hernia repair with the right side being quite large.  Postoperatively he had some right groin swelling and tissue edema that would extend down into the scrotum.  No pain problems.  Since her last visit he has noticed a bit of improvement with the swelling but it is still there.  Still not painful.  Had tried doing some scrotal elevation when lying down.    Physical exam: Vitals: BP (!) 149/90   Pulse 85   Wt 103 kg (227 lb)   BMI 30.79 kg/m    BMI= Body mass index is 30.79 kg/m .    Exam:  Constitutional: healthy, alert and no distress  : Some firm swelling along the cord on the right that extends down into the scrotum.  The testicle itself feels to still have some edema but this is softer than what is in the cord.  Left side feels normal.  No impulse with cough, not reducible. Not tender      Assessment:     ICD-10-CM    1. S/P bilateral inguinal hernia repair, follow-up exam  Z09 US Testicular & Scrotum w Doppler Ltd   2. Postoperative edema  R60.9 US Testicular & Scrotum w Doppler Ltd   3. Postoperative hematoma of musculoskeletal structure following musculoskeletal procedure  M96.840      Plan: I think there is combination of postoperative tissue edema and suspected hematoma in the cord related to the larger hernia here the extra dissection.  This should continue to improve with time.  It still does not feel like a recurrent hernia on clinical exam.  Discussed getting an ultrasound since this is still present and patient is agreeable.  We will await those results.  We will also plan  to have him follow-up with me again in 4 to 6 weeks for recheck.    Patric Gracia MD        Again, thank you for allowing me to participate in the care of your patient.        Sincerely,        Patric Gracia MD

## 2022-06-08 NOTE — PROGRESS NOTES
General Surgery Post Op    Pt returns for follow up visit for right groin swelling after bilateral inguinal hernia repair.    Patient had bilateral robotic inguinal hernia repair with the right side being quite large.  Postoperatively he had some right groin swelling and tissue edema that would extend down into the scrotum.  No pain problems.  Since her last visit he has noticed a bit of improvement with the swelling but it is still there.  Still not painful.  Had tried doing some scrotal elevation when lying down.    Physical exam: Vitals: BP (!) 149/90   Pulse 85   Wt 103 kg (227 lb)   BMI 30.79 kg/m    BMI= Body mass index is 30.79 kg/m .    Exam:  Constitutional: healthy, alert and no distress  : Some firm swelling along the cord on the right that extends down into the scrotum.  The testicle itself feels to still have some edema but this is softer than what is in the cord.  Left side feels normal.  No impulse with cough, not reducible. Not tender      Assessment:     ICD-10-CM    1. S/P bilateral inguinal hernia repair, follow-up exam  Z09 US Testicular & Scrotum w Doppler Ltd   2. Postoperative edema  R60.9 US Testicular & Scrotum w Doppler Ltd   3. Postoperative hematoma of musculoskeletal structure following musculoskeletal procedure  M96.840      Plan: I think there is combination of postoperative tissue edema and suspected hematoma in the cord related to the larger hernia here the extra dissection.  This should continue to improve with time.  It still does not feel like a recurrent hernia on clinical exam.  Discussed getting an ultrasound since this is still present and patient is agreeable.  We will await those results.  We will also plan to have him follow-up with me again in 4 to 6 weeks for recheck.    Patric Gracia MD

## 2022-06-22 ENCOUNTER — ANCILLARY PROCEDURE (OUTPATIENT)
Dept: ULTRASOUND IMAGING | Facility: CLINIC | Age: 62
End: 2022-06-22
Attending: SURGERY
Payer: COMMERCIAL

## 2022-06-22 DIAGNOSIS — Z87.19 S/P BILATERAL INGUINAL HERNIA REPAIR, FOLLOW-UP EXAM: ICD-10-CM

## 2022-06-22 DIAGNOSIS — R60.9 POSTOPERATIVE EDEMA: ICD-10-CM

## 2022-06-22 DIAGNOSIS — Z09 S/P BILATERAL INGUINAL HERNIA REPAIR, FOLLOW-UP EXAM: ICD-10-CM

## 2022-06-22 PROCEDURE — 93976 VASCULAR STUDY: CPT

## 2022-06-22 PROCEDURE — 76870 US EXAM SCROTUM: CPT

## 2022-06-29 DIAGNOSIS — N52.9 ERECTILE DYSFUNCTION, UNSPECIFIED ERECTILE DYSFUNCTION TYPE: ICD-10-CM

## 2022-06-30 RX ORDER — SILDENAFIL CITRATE 20 MG/1
TABLET ORAL
Qty: 30 TABLET | Refills: 0 | Status: SHIPPED | OUTPATIENT
Start: 2022-06-30

## 2022-07-13 ENCOUNTER — OFFICE VISIT (OUTPATIENT)
Dept: SURGERY | Facility: CLINIC | Age: 62
End: 2022-07-13
Payer: COMMERCIAL

## 2022-07-13 VITALS
HEART RATE: 77 BPM | BODY MASS INDEX: 30.79 KG/M2 | SYSTOLIC BLOOD PRESSURE: 144 MMHG | DIASTOLIC BLOOD PRESSURE: 85 MMHG | WEIGHT: 227 LBS

## 2022-07-13 DIAGNOSIS — Z87.19 S/P BILATERAL INGUINAL HERNIA REPAIR, FOLLOW-UP EXAM: ICD-10-CM

## 2022-07-13 DIAGNOSIS — Z09 S/P BILATERAL INGUINAL HERNIA REPAIR, FOLLOW-UP EXAM: ICD-10-CM

## 2022-07-13 DIAGNOSIS — M96.840 POSTOPERATIVE HEMATOMA OF MUSCULOSKELETAL STRUCTURE FOLLOWING MUSCULOSKELETAL PROCEDURE: Primary | ICD-10-CM

## 2022-07-13 PROCEDURE — 99024 POSTOP FOLLOW-UP VISIT: CPT | Performed by: SURGERY

## 2022-07-13 NOTE — PROGRESS NOTES
General Surgery Post Op    Pt returns for follow up visit for hematoma after his inguinal hernia repair    Patient has been doing well, still no pain but can still feel a lump in the right groin.  He does feel like the swelling in the cord and testicle has improved where that feels normal now.  The hematoma lump seems to have improved in size a bit as well    Physical exam: Vitals: BP (!) 144/85   Pulse 77   Wt 103 kg (227 lb)   BMI 30.79 kg/m    BMI= Body mass index is 30.79 kg/m .    Exam:  Constitutional: healthy, alert and no distress  Right groin with palpable firm nonreducible lump higher up in the inguinal canal, nontender.  Still feels consistent with hematoma and not any recurrent hernia.  The more distal part of the cord into the testicle now feels to be normal in size without further edema.    Scrotal ultrasound     Comparisons: None.     HISTORY: Recent bilateral inguinal hernia repair with right groin and  scrotal swelling     Findings: The right left testicles measure 3.8 x 2.7 x 3.0cm and 4.3 x  2.9 x 3.0cm, respectively. There is a benign-appearing cyst in the  right testicle measuring up to 8 mm. Otherwise, testicular  echogenicity and blood flow are symmetric and normal. There is no  testicular mass. There is a cyst in the right epididymal head  measuring up to 9 mm. Otherwise, the epididymides appear normal. There  is a small right hydrocele. There is no varicocele. There is a complex  fluid collection in the right groin, area of swelling which is  suggestive of a resolving hematoma measuring 11.3 x 4.1 x 4.0 cm..                                                                      IMPRESSION:   1. Findings most compatible with resolving hematoma in the right  groin.  2. Small right hydrocele.  3. Benign-appearing cyst in the right testicle and right epididymal  head.    Assessment:     ICD-10-CM    1. Postoperative hematoma of musculoskeletal structure following musculoskeletal procedure   M96.840    2. S/P bilateral inguinal hernia repair, follow-up exam  Z09      Plan: Ultrasound had confirmed suspicion of hematoma in the right groin after his inguinal hernia repair.  This remains asymptomatic and appears to still be improving with time.  Recommended ongoing observation.  Okay for activities as tolerated.  We will leave further follow-ups as needed.    Patric Gracia MD

## 2022-07-13 NOTE — LETTER
7/13/2022         RE: Mor Henry  [address]  [place] [address]        Dear Colleague,    Thank you for referring your patient, Mor Henry, to the [facility]. Please see a copy of my visit note below.    General Surgery Post Op    Pt returns for follow up visit for hematoma after his inguinal hernia repair    Patient has been doing well, still no pain but can still feel a lump in the right groin.  He does feel like the swelling in the cord and testicle has improved where that feels normal now.  The hematoma lump seems to have improved in size a bit as well    Physical exam: Vitals: BP (!) 144/85   Pulse 77   Wt 103 kg (227 lb)   BMI 30.79 kg/m    BMI= Body mass index is 30.79 kg/m .    Exam:  Constitutional: healthy, alert and no distress  Right groin with palpable firm nonreducible lump higher up in the inguinal canal, nontender.  Still feels consistent with hematoma and not any recurrent hernia.  The more distal part of the cord into the testicle now feels to be normal in size without further edema.    Scrotal ultrasound     Comparisons: None.     HISTORY: Recent bilateral inguinal hernia repair with right groin and  scrotal swelling     Findings: The right left testicles measure 3.8 x 2.7 x 3.0cm and 4.3 x  2.9 x 3.0cm, respectively. There is a benign-appearing cyst in the  right testicle measuring up to 8 mm. Otherwise, testicular  echogenicity and blood flow are symmetric and normal. There is no  testicular mass. There is a cyst in the right epididymal head  measuring up to 9 mm. Otherwise, the epididymides appear normal. There  is a small right hydrocele. There is no varicocele. There is a complex  fluid collection in the right groin, area of swelling which is  suggestive of a resolving hematoma measuring 11.3 x 4.1 x 4.0 cm..                                                                      IMPRESSION:   1. Findings most compatible with  resolving hematoma in the right  groin.  2. Small right hydrocele.  3. Benign-appearing cyst in the right testicle and right epididymal  head.    Assessment:     ICD-10-CM    1. Postoperative hematoma of musculoskeletal structure following musculoskeletal procedure  M96.840    2. S/P bilateral inguinal hernia repair, follow-up exam  Z09      Plan: Ultrasound had confirmed suspicion of hematoma in the right groin after his inguinal hernia repair.  This remains asymptomatic and appears to still be improving with time.  Recommended ongoing observation.  Okay for activities as tolerated.  We will leave further follow-ups as needed.    Patric Gracia MD          Again, thank you for allowing me to participate in the care of your patient.        Sincerely,        Patric Gracia MD

## 2022-08-14 ENCOUNTER — HEALTH MAINTENANCE LETTER (OUTPATIENT)
Age: 62
End: 2022-08-14

## 2022-08-16 ENCOUNTER — LAB (OUTPATIENT)
Dept: LAB | Facility: CLINIC | Age: 62
End: 2022-08-16
Payer: COMMERCIAL

## 2022-08-16 DIAGNOSIS — E11.9 TYPE 2 DIABETES MELLITUS WITHOUT COMPLICATION, WITHOUT LONG-TERM CURRENT USE OF INSULIN (H): Primary | ICD-10-CM

## 2022-08-16 LAB
ANION GAP SERPL CALCULATED.3IONS-SCNC: 5 MMOL/L (ref 3–14)
BUN SERPL-MCNC: 9 MG/DL (ref 7–30)
CALCIUM SERPL-MCNC: 9 MG/DL (ref 8.5–10.1)
CHLORIDE BLD-SCNC: 109 MMOL/L (ref 94–109)
CO2 SERPL-SCNC: 27 MMOL/L (ref 20–32)
CREAT SERPL-MCNC: 0.79 MG/DL (ref 0.66–1.25)
GFR SERPL CREATININE-BSD FRML MDRD: >90 ML/MIN/1.73M2
GLUCOSE BLD-MCNC: 91 MG/DL (ref 70–99)
HBA1C MFR BLD: 6 % (ref 0–5.6)
POTASSIUM BLD-SCNC: 4.1 MMOL/L (ref 3.4–5.3)
SODIUM SERPL-SCNC: 141 MMOL/L (ref 133–144)

## 2022-08-16 PROCEDURE — 83036 HEMOGLOBIN GLYCOSYLATED A1C: CPT

## 2022-08-16 PROCEDURE — 80048 BASIC METABOLIC PNL TOTAL CA: CPT

## 2022-08-16 PROCEDURE — 36415 COLL VENOUS BLD VENIPUNCTURE: CPT

## 2022-08-21 NOTE — RESULT ENCOUNTER NOTE
Mor, your test results were within normal limits.  Electrolytes, glucose and kidney function are normal.  Three month glucose number is at goal at 6.     Please do not hesitate to call us at [phone] if you have any questions or concerns.    Thank you,    Julia Kraus MD MPH

## 2022-09-25 DIAGNOSIS — E78.5 HYPERLIPIDEMIA LDL GOAL <100: ICD-10-CM

## 2022-09-26 RX ORDER — ATORVASTATIN CALCIUM 20 MG/1
20 TABLET, FILM COATED ORAL DAILY
Qty: 90 TABLET | Refills: 0 | Status: SHIPPED | OUTPATIENT
Start: 2022-09-26 | End: 2022-12-26

## 2022-09-27 DIAGNOSIS — E78.5 HYPERLIPIDEMIA LDL GOAL <100: ICD-10-CM

## 2022-09-28 RX ORDER — ATORVASTATIN CALCIUM 20 MG/1
20 TABLET, FILM COATED ORAL DAILY
Qty: 90 TABLET | Refills: 0 | OUTPATIENT
Start: 2022-09-28

## 2022-11-19 ENCOUNTER — HEALTH MAINTENANCE LETTER (OUTPATIENT)
Age: 62
End: 2022-11-19

## 2022-11-22 ENCOUNTER — LAB (OUTPATIENT)
Dept: LAB | Facility: CLINIC | Age: 62
End: 2022-11-22
Payer: COMMERCIAL

## 2022-11-22 DIAGNOSIS — E11.9 DIABETES MELLITUS, TYPE 2 (H): Primary | ICD-10-CM

## 2022-11-22 LAB — HBA1C MFR BLD: 6.6 % (ref 0–5.6)

## 2022-11-22 PROCEDURE — 36415 COLL VENOUS BLD VENIPUNCTURE: CPT

## 2022-11-22 PROCEDURE — 83036 HEMOGLOBIN GLYCOSYLATED A1C: CPT

## 2023-03-26 DIAGNOSIS — E78.5 HYPERLIPIDEMIA LDL GOAL <100: ICD-10-CM

## 2023-03-27 RX ORDER — ATORVASTATIN CALCIUM 20 MG/1
20 TABLET, FILM COATED ORAL DAILY
Qty: 90 TABLET | Refills: 0 | Status: SHIPPED | OUTPATIENT
Start: 2023-03-27 | End: 2023-06-14

## 2023-03-27 NOTE — TELEPHONE ENCOUNTER
Called and spoke to patient informed that refill was sent he [facility] that [facility]

## 2023-04-09 ENCOUNTER — HEALTH MAINTENANCE LETTER (OUTPATIENT)
Age: 63
End: 2023-04-09

## 2023-06-07 ASSESSMENT — ENCOUNTER SYMPTOMS
NAUSEA: 0
JOINT SWELLING: 0
HEADACHES: 0
SORE THROAT: 0
FREQUENCY: 0
DYSURIA: 0
DIZZINESS: 0
HEMATOCHEZIA: 0
FEVER: 0
CHILLS: 0
HEMATURIA: 0
SHORTNESS OF BREATH: 0
PALPITATIONS: 0
PARESTHESIAS: 0
EYE PAIN: 0
WEAKNESS: 0
COUGH: 0
MYALGIAS: 0
HEARTBURN: 0
DIARRHEA: 0
ARTHRALGIAS: 0
NERVOUS/ANXIOUS: 0
ABDOMINAL PAIN: 0
CONSTIPATION: 0

## 2023-06-14 ENCOUNTER — OFFICE VISIT (OUTPATIENT)
Dept: FAMILY MEDICINE | Facility: CLINIC | Age: 63
End: 2023-06-14
Payer: COMMERCIAL

## 2023-06-14 ENCOUNTER — TELEPHONE (OUTPATIENT)
Dept: FAMILY MEDICINE | Facility: CLINIC | Age: 63
End: 2023-06-14

## 2023-06-14 VITALS
OXYGEN SATURATION: 97 % | SYSTOLIC BLOOD PRESSURE: 152 MMHG | RESPIRATION RATE: 15 BRPM | HEIGHT: 73 IN | WEIGHT: 231.2 LBS | TEMPERATURE: 97.8 F | HEART RATE: 73 BPM | BODY MASS INDEX: 30.64 KG/M2 | DIASTOLIC BLOOD PRESSURE: 96 MMHG

## 2023-06-14 DIAGNOSIS — I10 BENIGN ESSENTIAL HYPERTENSION: ICD-10-CM

## 2023-06-14 DIAGNOSIS — E78.5 HYPERLIPIDEMIA LDL GOAL <100: ICD-10-CM

## 2023-06-14 DIAGNOSIS — R97.20 ELEVATED PROSTATE SPECIFIC ANTIGEN (PSA): ICD-10-CM

## 2023-06-14 DIAGNOSIS — N52.9 ERECTILE DYSFUNCTION, UNSPECIFIED ERECTILE DYSFUNCTION TYPE: ICD-10-CM

## 2023-06-14 DIAGNOSIS — E11.9 TYPE 2 DIABETES MELLITUS WITHOUT COMPLICATION, WITHOUT LONG-TERM CURRENT USE OF INSULIN (H): ICD-10-CM

## 2023-06-14 DIAGNOSIS — Z00.00 ROUTINE GENERAL MEDICAL EXAMINATION AT A HEALTH CARE FACILITY: Primary | ICD-10-CM

## 2023-06-14 LAB
ALBUMIN SERPL BCG-MCNC: 4.6 G/DL (ref 3.5–5.2)
ALP SERPL-CCNC: 94 U/L (ref 40–129)
ALT SERPL W P-5'-P-CCNC: 37 U/L (ref 0–70)
ANION GAP SERPL CALCULATED.3IONS-SCNC: 13 MMOL/L (ref 7–15)
AST SERPL W P-5'-P-CCNC: 34 U/L (ref 0–45)
BILIRUB SERPL-MCNC: 2 MG/DL
BUN SERPL-MCNC: 10.5 MG/DL (ref 8–23)
CALCIUM SERPL-MCNC: 8.7 MG/DL (ref 8.8–10.2)
CHLORIDE SERPL-SCNC: 107 MMOL/L (ref 98–107)
CHOLEST SERPL-MCNC: 125 MG/DL
CREAT SERPL-MCNC: 0.79 MG/DL (ref 0.67–1.17)
CREAT UR-MCNC: 23.1 MG/DL
DEPRECATED HCO3 PLAS-SCNC: 24 MMOL/L (ref 22–29)
GFR SERPL CREATININE-BSD FRML MDRD: >90 ML/MIN/1.73M2
GLUCOSE SERPL-MCNC: 112 MG/DL (ref 70–99)
HBA1C MFR BLD: 6.3 % (ref 0–5.6)
HDLC SERPL-MCNC: 52 MG/DL
HGB BLD-MCNC: 16.3 G/DL (ref 13.3–17.7)
LDLC SERPL CALC-MCNC: 54 MG/DL
MICROALBUMIN UR-MCNC: <12 MG/L
MICROALBUMIN/CREAT UR: NORMAL MG/G{CREAT}
NONHDLC SERPL-MCNC: 73 MG/DL
POTASSIUM SERPL-SCNC: 4.4 MMOL/L (ref 3.4–5.3)
PROT SERPL-MCNC: 7.1 G/DL (ref 6.4–8.3)
PSA SERPL DL<=0.01 NG/ML-MCNC: 3.13 NG/ML (ref 0–4.5)
SODIUM SERPL-SCNC: 144 MMOL/L (ref 136–145)
TRIGL SERPL-MCNC: 94 MG/DL
TSH SERPL DL<=0.005 MIU/L-ACNC: 0.87 UIU/ML (ref 0.3–4.2)

## 2023-06-14 PROCEDURE — 80053 COMPREHEN METABOLIC PANEL: CPT | Performed by: PREVENTIVE MEDICINE

## 2023-06-14 PROCEDURE — 82043 UR ALBUMIN QUANTITATIVE: CPT | Performed by: PREVENTIVE MEDICINE

## 2023-06-14 PROCEDURE — 36415 COLL VENOUS BLD VENIPUNCTURE: CPT | Performed by: PREVENTIVE MEDICINE

## 2023-06-14 PROCEDURE — 99213 OFFICE O/P EST LOW 20 MIN: CPT | Mod: 25 | Performed by: PREVENTIVE MEDICINE

## 2023-06-14 PROCEDURE — 99396 PREV VISIT EST AGE 40-64: CPT | Performed by: PREVENTIVE MEDICINE

## 2023-06-14 PROCEDURE — G0103 PSA SCREENING: HCPCS | Performed by: PREVENTIVE MEDICINE

## 2023-06-14 PROCEDURE — 80061 LIPID PANEL: CPT | Performed by: PREVENTIVE MEDICINE

## 2023-06-14 PROCEDURE — 84443 ASSAY THYROID STIM HORMONE: CPT | Performed by: PREVENTIVE MEDICINE

## 2023-06-14 PROCEDURE — 82570 ASSAY OF URINE CREATININE: CPT | Performed by: PREVENTIVE MEDICINE

## 2023-06-14 PROCEDURE — 83036 HEMOGLOBIN GLYCOSYLATED A1C: CPT | Performed by: PREVENTIVE MEDICINE

## 2023-06-14 PROCEDURE — 85018 HEMOGLOBIN: CPT | Performed by: PREVENTIVE MEDICINE

## 2023-06-14 RX ORDER — AMLODIPINE BESYLATE 5 MG/1
5 TABLET ORAL DAILY
Qty: 90 TABLET | Refills: 3 | Status: SHIPPED | OUTPATIENT
Start: 2023-06-14 | End: 2024-05-31

## 2023-06-14 RX ORDER — ATORVASTATIN CALCIUM 20 MG/1
20 TABLET, FILM COATED ORAL DAILY
Qty: 90 TABLET | Refills: 3 | Status: SHIPPED | OUTPATIENT
Start: 2023-06-14 | End: 2024-05-31

## 2023-06-14 ASSESSMENT — ENCOUNTER SYMPTOMS
NERVOUS/ANXIOUS: 0
PALPITATIONS: 0
CHILLS: 0
NAUSEA: 0
ARTHRALGIAS: 0
ABDOMINAL PAIN: 0
SORE THROAT: 0
CONSTIPATION: 0
FEVER: 0
EYE PAIN: 0
DIZZINESS: 0
FREQUENCY: 0
SHORTNESS OF BREATH: 0
HEARTBURN: 0
MYALGIAS: 0
DYSURIA: 0
COUGH: 0
HEMATURIA: 0
DIARRHEA: 0
PARESTHESIAS: 0
HEMATOCHEZIA: 0
HEADACHES: 0
WEAKNESS: 0
JOINT SWELLING: 0

## 2023-06-14 NOTE — TELEPHONE ENCOUNTER
Patient Quality Outreach    Patient is due for the following:   Diabetes -  Eye Exam    Next Steps:   Follow up with eye doctor if this has not been done.    Type of outreach:    Sent letter.      Questions for provider review:    None           Gloria Morton MA

## 2023-06-14 NOTE — RESULT ENCOUNTER NOTE
Mor,     Hemoglobin is normal, you are not anemic.  3-month glucose number hemoglobin A1c has improved from 6.6-6.3 and is now in a prediabetic range.  Be sure to monitor your intake of things like bread, pasta, rice, starchy foods (ie: potatoes), sugary beverages (ie: soda, juice-even the natural kind) and alcohol.  I recommend your plate be 1/2 vegetables, 1/4 protein, and 1/4 carbohydrate.    Other labs are pending.     Please do not hesitate to call us at [phone] if you have any questions or concerns.    Thank you,    Julia Kraus MD MPH

## 2023-06-14 NOTE — PROGRESS NOTES
SUBJECTIVE:   CC: Forrest is an 62 year old who presents for preventative health visit.       6/14/2023     8:33 AM   Additional Questions   Roomed by Abraham FREIRE     Healthy Habits:     Getting at least 3 servings of Calcium per day:  Yes    Bi-annual eye exam:  Yes    Dental care twice a year:  Yes    Sleep apnea or symptoms of sleep apnea:  None    Diet:  Regular (no restrictions)    Frequency of exercise:  2-3 days/week    Duration of exercise:  30-45 minutes    Taking medications regularly:  Yes    Medication side effects:  None    PHQ-2 Total Score: 0    Additional concerns today:  No    Colonoscopy done 4/2021+    Elevated PSA, was referred to Urology     Will be retiring in a few weeks and will not have insurance .      Wt Readings from Last 2 Encounters:   06/14/23 104.9 kg (231 lb 3.2 oz)   07/13/22 103 kg (227 lb)         Diabetes Follow-up      How often are you checking your blood sugar? Not at all    What concerns do you have today about your diabetes? None     Do you have any of these symptoms? (Select all that apply)  No numbness or tingling in feet.  No redness, sores or blisters on feet.  No complaints of excessive thirst.  No reports of blurry vision.  No significant changes to weight.    Have you had a diabetic eye exam in the last 12 months? No    Eye exam last summer.     BP Readings from Last 2 Encounters:   06/14/23 (!) 152/96   07/13/22 (!) 144/85     Hemoglobin A1C (%)   Date Value   11/22/2022 6.6 (H)   08/16/2022 6.0 (H)   03/09/2021 7.1 (H)     LDL Cholesterol Calculated (mg/dL)   Date Value   01/21/2022 42   03/09/2021 121 (H)   08/25/2015 112     LDL Cholesterol Direct (mg/dL)   Date Value   09/14/2021 63           Hyperlipidemia Follow-Up      Are you regularly taking any medication or supplement to lower your cholesterol?   Yes- Statin    Are you having muscle aches or other side effects that you think could be caused by your cholesterol lowering medication?  No      Today's PHQ-2 Score:        6/14/2023     8:14 AM   PHQ-2 ( 1999 [employer])   Q1: Little interest or pleasure in doing things 0   Q2: Feeling down, depressed or hopeless 0   PHQ-2 Score 0   Q1: Little interest or pleasure in doing things Not at all   Q2: Feeling down, depressed or hopeless Not at all   PHQ-2 Score 0           Social History     Tobacco Use     Smoking status: Never     Smokeless tobacco: Never   Vaping Use     Vaping status: Not on file   Substance Use Topics     Alcohol use: Yes             6/7/2023     7:58 PM   Alcohol Use   Prescreen: >3 drinks/day or >7 drinks/week? No          View : No data to display.                Last PSA:   Prostate Specific Antigen Screen   Date Value Ref Range Status   01/21/2022 5.55 (H) 0.00 - 4.00 ug/L Final       Reviewed orders with patient. Reviewed health maintenance and updated orders accordingly - Yes  Lab work is in process  Labs reviewed in EPIC  BP Readings from Last 3 Encounters:   06/14/23 (!) 152/96   07/13/22 (!) 144/85   06/08/22 (!) 149/90    Wt Readings from Last 3 Encounters:   06/14/23 104.9 kg (231 lb 3.2 oz)   07/13/22 103 kg (227 lb)   06/08/22 103 kg (227 lb)                  Patient Active Problem List   Diagnosis     CARDIOVASCULAR SCREENING; LDL GOAL LESS THAN 160     Obesity, Class I, BMI 30-34.9     Advanced directives, counseling/discussion     Diverticulosis of large intestine     Diabetes mellitus, type 2 (H)     Hyperlipidemia LDL goal <100     Past Surgical History:   Procedure Laterality Date     COLONOSCOPY N/A 10/05/2015    Procedure: COLONOSCOPY;  Surgeon: Emile Holloway MD;  Location: [facility]     COLONOSCOPY WITH CO2 INSUFFLATION N/A 10/05/2015    Procedure: COLONOSCOPY WITH CO2 INSUFFLATION;  Surgeon: Emile Holloway MD;  Location: [facility]     COLONOSCOPY WITH CO2 INSUFFLATION N/A 04/26/2021    Procedure: COLONOSCOPY, WITH CO2 INSUFFLATION;  Surgeon: Emile Holloway MD;  Location: [facility]     LAPAROSCOPIC HERNIORRHAPHY INGUINAL  BILATERAL Bilateral 05/10/2022    robotic       Social History     Tobacco Use     Smoking status: Never     Smokeless tobacco: Never   Vaping Use     Vaping status: Not on file   Substance Use Topics     Alcohol use: Yes     Family History   Problem Relation Age of Onset     Diabetes Mother      Hypertension Father      Hyperlipidemia Father      Diabetes Sister      Diabetes Brother          Current Outpatient Medications   Medication Sig Dispense Refill     amLODIPine (NORVASC) 5 MG tablet Take 1 tablet (5 mg) by mouth daily For blood pressure 90 tablet 3     atorvastatin (LIPITOR) 20 MG tablet Take 1 tablet (20 mg) by mouth daily For cholesterol 90 tablet 3     sildenafil (REVATIO) 20 MG tablet Take 1-5 tablets by mouth 2 hours before intercourse. Maximum 100 mg in 24 hours. 30 tablet 0     No Known Allergies  Recent Labs   Lab Test 11/22/22  1408 08/16/22  1350 01/21/22  0849 09/14/21  1629 09/14/21  1629 03/09/21  0843 08/25/15  0735   A1C 6.6* 6.0* 6.0*   < > 5.8* 7.1*  --    LDL  --   --  42  --  63 121* 112   HDL  --   --  61  --   --  50 50   TRIG  --   --  99  --   --  167* 126   CR  --  0.79  --   --  0.74 0.86  --    GFRESTIMATED  --  >90  --   --  >90 >90  --    GFRESTBLACK  --   --   --   --   --  >90  --    POTASSIUM  --  4.1  --   --  3.8 4.5  --     < > = values in this interval not displayed.        Reviewed and updated as needed this visit by clinical staff   Tobacco  Allergies  Meds  Problems  Med Hx  Surg Hx  Fam Hx          Reviewed and updated as needed this visit by Provider   Tobacco  Allergies  Meds  Problems  Med Hx  Surg Hx  Fam Hx         History reviewed. No pertinent past medical history.   Past Surgical History:   Procedure Laterality Date     COLONOSCOPY N/A 10/05/2015    Procedure: COLONOSCOPY;  Surgeon: Emile Holloway MD;  Location: [facility]     COLONOSCOPY WITH CO2 INSUFFLATION N/A 10/05/2015    Procedure: COLONOSCOPY WITH CO2 INSUFFLATION;  Surgeon:  "Emile Holloway MD;  Location: [facility]     COLONOSCOPY WITH CO2 INSUFFLATION N/A 04/26/2021    Procedure: COLONOSCOPY, WITH CO2 INSUFFLATION;  Surgeon: Emile Holloway MD;  Location: [facility]     LAPAROSCOPIC HERNIORRHAPHY INGUINAL BILATERAL Bilateral 05/10/2022    robotic       Review of Systems   Constitutional: Negative for chills and fever.   HENT: Negative for congestion, ear pain, hearing loss and sore throat.    Eyes: Negative for pain and visual disturbance.   Respiratory: Negative for cough and shortness of breath.    Cardiovascular: Negative for chest pain, palpitations and peripheral edema.   Gastrointestinal: Negative for abdominal pain, constipation, diarrhea, heartburn, hematochezia and nausea.   Genitourinary: Negative for dysuria, frequency, genital sores, hematuria, impotence, penile discharge and urgency.   Musculoskeletal: Negative for arthralgias, joint swelling and myalgias.   Skin: Negative for rash.   Neurological: Negative for dizziness, weakness, headaches and paresthesias.   Psychiatric/Behavioral: Negative for mood changes. The patient is not nervous/anxious.          OBJECTIVE:   BP (!) 152/96 (BP Location: Left arm, Patient Position: Sitting, Cuff Size: Adult Large)   Pulse 73   Temp 97.8  F (36.6  C) (Tympanic)   Resp 15   Ht 1.842 m (6' 0.5\")   Wt 104.9 kg (231 lb 3.2 oz)   SpO2 97%   BMI 30.93 kg/m      Physical Exam  GENERAL APPEARANCE: healthy, alert and no distress  EYES: Eyes grossly normal to inspection and conjunctivae and sclerae normal  HENT: Intact TMs  NECK: no adenopathy and trachea midline and normal to palpation  RESP: lungs clear to auscultation - no rales, rhonchi or wheezes  CV: regular rates and rhythm, normal S1 S2, no S3 or S4 and no murmur, click or rub  ABDOMEN: soft, non-tender and no rebound or guarding   MS: extremities normal- no gross deformities noted and peripheral pulses normal  SKIN: no suspicious lesions or rashes  NEURO: Normal " strength and tone, mentation intact and speech normal  PSYCH: mentation appears normal  Diabetic foot exam: normal DP and PT pulses, no trophic changes or ulcerative lesions and normal sensory exam      Diagnostic Test Results:  Labs reviewed in Epic  No results found for this or any previous visit (from the past 24 hour(s)).    ASSESSMENT/PLAN:   Mor was seen today for physical.    Diagnoses and all orders for this visit:    Routine general medical examination at a health care facility    Type 2 diabetes mellitus without complication, without long-term current use of insulin (H)  -     Adult Eye  Referral; Future  -    Await labs  -     HEMOGLOBIN A1C  -     Albumin Random Urine Quantitative with Creat Ratio  -     TSH with free T4 reflex  -     Comprehensive metabolic panel  -     Hemoglobin    Hyperlipidemia LDL goal <100  -    Refills on medication provided   -     atorvastatin (LIPITOR) 20 MG tablet; Take 1 tablet (20 mg) by mouth daily For cholesterol  -     Lipid panel reflex to direct LDL Non-fasting  -     Comprehensive metabolic panel    The ASCVD Risk score (Jakob BURTON, et al., 2019) failed to calculate for the following reasons:    The valid total cholesterol range is 130 to 320 mg/dL    Elevated prostate specific antigen (PSA)  -     Elevated in the past, did not follow up with Urology   -     PSA, screen    Erectile dysfunction, unspecified erectile dysfunction type  -declined refills on medication today    Benign essential hypertension  -     amLODIPine (NORVASC) 5 MG tablet; Take 1 tablet (5 mg) by mouth daily For blood pressure  -elevated readings on several occasions  -DASH diet discussed  -150 minutes of moderate physical activity per week  -can update me on blood pressure readings via My Chart.     Other orders  -     REVIEW OF HEALTH MAINTENANCE PROTOCOL ORDERS  -     PRIMARY CARE FOLLOW-UP SCHEDULING; Future            COUNSELING:   Reviewed preventive health counseling, as reflected  in patient instructions       Regular exercise       Healthy diet/nutrition       Vision screening       Prostate cancer screening        He reports that he has never smoked. He has never used smokeless tobacco.            Julia Kraus MD MPH    [facility]

## 2023-06-19 NOTE — RESULT ENCOUNTER NOTE
Mor,     Electrolytes, glucose, kidney function and liver function tests are within normal limits.  Total bilirubin is slightly elevated. We can recheck this at next set of labs. This would need to be checked sooner if you develop abdominal pain, vomiting or jaundice.   Urine sample is not showing any abnormal protein.  Cholesterol is at goal for you.   Thyroid function is normal.  Prostate screening blood test is normal.     Please do not hesitate to call us at [phone] if you have any questions or concerns.    Thank you,    Julia Kraus MD MPH

## 2023-10-25 ENCOUNTER — IMMUNIZATION (OUTPATIENT)
Dept: NURSING | Facility: CLINIC | Age: 63
End: 2023-10-25
Payer: COMMERCIAL

## 2023-10-25 PROCEDURE — 91320 SARSCV2 VAC 30MCG TRS-SUC IM: CPT

## 2023-10-25 PROCEDURE — 90480 ADMN SARSCOV2 VAC 1/ONLY CMP: CPT

## 2023-11-18 ENCOUNTER — HEALTH MAINTENANCE LETTER (OUTPATIENT)
Age: 63
End: 2023-11-18

## 2023-11-20 ENCOUNTER — PATIENT OUTREACH (OUTPATIENT)
Dept: GASTROENTEROLOGY | Facility: CLINIC | Age: 63
End: 2023-11-20
Payer: COMMERCIAL

## 2024-02-01 ENCOUNTER — PATIENT OUTREACH (OUTPATIENT)
Dept: GASTROENTEROLOGY | Facility: CLINIC | Age: 64
End: 2024-02-01
Payer: COMMERCIAL

## 2024-02-01 DIAGNOSIS — Z12.11 SPECIAL SCREENING FOR MALIGNANT NEOPLASMS, COLON: Primary | ICD-10-CM

## 2024-02-01 NOTE — PROGRESS NOTES
"[facility] Screening Colonoscopy Referral Review    Patient meets the inclusion criteria for screening colonoscopy standing order.    Ordering/Referring Provider:  Julia Kraus      BMI: Estimated body mass index is 30.93 kg/m  as calculated from the following:    Height as of 6/14/23: 1.842 m (6' 0.5\").    Weight as of 6/14/23: 104.9 kg (231 lb 3.2 oz).     Sedation:  Does patient have any of the following conditions affecting sedation?  No medical conditions affecting sedation.    Previous Scopes:  Any previous recommendations or follow up needs based on previous scope?  na / No recommendations.    Medical Concerns to Postpone Order:  Does patient have any of the following medical concerns that should postpone/delay colonoscopy referral?  No medical conditions affecting colonoscopy referral.    Final Referral Details:  Based on patient's medical history patient is appropriate for referral order with moderate sedation. If patient's BMI > 50 do not schedule in [facility].  "

## 2024-04-06 ENCOUNTER — HEALTH MAINTENANCE LETTER (OUTPATIENT)
Age: 64
End: 2024-04-06

## 2024-05-31 DIAGNOSIS — I10 BENIGN ESSENTIAL HYPERTENSION: ICD-10-CM

## 2024-05-31 DIAGNOSIS — E78.5 HYPERLIPIDEMIA LDL GOAL <100: ICD-10-CM

## 2024-05-31 RX ORDER — AMLODIPINE BESYLATE 5 MG/1
5 TABLET ORAL DAILY
Qty: 90 TABLET | Refills: 0 | Status: SHIPPED | OUTPATIENT
Start: 2024-05-31

## 2024-05-31 RX ORDER — ATORVASTATIN CALCIUM 20 MG/1
20 TABLET, FILM COATED ORAL DAILY
Qty: 90 TABLET | Refills: 0 | Status: SHIPPED | OUTPATIENT
Start: 2024-05-31

## 2024-05-31 NOTE — TELEPHONE ENCOUNTER
Called and left a voicemail message to return our call to schedule an appointment.  Lyudmila Aviles MA  [facility]   Primary Care

## 2024-08-24 ENCOUNTER — HEALTH MAINTENANCE LETTER (OUTPATIENT)
Age: 64
End: 2024-08-24

## 2024-12-29 ENCOUNTER — HEALTH MAINTENANCE LETTER (OUTPATIENT)
Age: 64
End: 2024-12-29

## 2025-04-13 ENCOUNTER — HEALTH MAINTENANCE LETTER (OUTPATIENT)
Age: 65
End: 2025-04-13

## 2025-04-29 ENCOUNTER — PATIENT OUTREACH (OUTPATIENT)
Dept: GASTROENTEROLOGY | Facility: CLINIC | Age: 65
End: 2025-04-29
Payer: COMMERCIAL

## 2025-04-29 DIAGNOSIS — Z12.11 SPECIAL SCREENING FOR MALIGNANT NEOPLASMS, COLON: Primary | ICD-10-CM

## 2025-04-29 PROBLEM — D12.6 ADENOMATOUS COLON POLYP: Status: ACTIVE | Noted: 2025-04-29

## 2025-07-27 ENCOUNTER — HEALTH MAINTENANCE LETTER (OUTPATIENT)
Age: 65
End: 2025-07-27

## (undated) DEVICE — SOL WATER IRRIG 1000ML BOTTLE 07139-09

## (undated) DEVICE — PREP CHLORAPREP 26ML TINTED ORANGE  260815

## (undated) RX ORDER — FENTANYL CITRATE 50 UG/ML
INJECTION, SOLUTION INTRAMUSCULAR; INTRAVENOUS
Status: DISPENSED
Start: 2021-04-26

## (undated) RX ORDER — SIMETHICONE 40MG/0.6ML
SUSPENSION, DROPS(FINAL DOSAGE FORM)(ML) ORAL
Status: DISPENSED
Start: 2021-04-26